# Patient Record
Sex: MALE | Race: BLACK OR AFRICAN AMERICAN | Employment: UNEMPLOYED | URBAN - METROPOLITAN AREA
[De-identification: names, ages, dates, MRNs, and addresses within clinical notes are randomized per-mention and may not be internally consistent; named-entity substitution may affect disease eponyms.]

---

## 2018-06-08 PROBLEM — E11.9 TYPE 2 DIABETES MELLITUS (HCC): Chronic | Status: ACTIVE | Noted: 2018-06-08

## 2018-06-08 PROBLEM — E78.5 HLD (HYPERLIPIDEMIA): Chronic | Status: ACTIVE | Noted: 2018-06-08

## 2018-06-08 PROBLEM — R07.9 CHEST PAIN: Status: ACTIVE | Noted: 2018-06-08

## 2018-06-08 PROBLEM — E87.6 HYPOKALEMIA: Status: ACTIVE | Noted: 2018-06-08

## 2018-06-08 PROBLEM — I10 ESSENTIAL HYPERTENSION: Chronic | Status: ACTIVE | Noted: 2018-06-08

## 2019-11-13 ENCOUNTER — HOSPITAL ENCOUNTER (OUTPATIENT)
Dept: NUCLEAR MEDICINE | Age: 59
Discharge: HOME | End: 2019-11-13
Payer: COMMERCIAL

## 2019-11-13 DIAGNOSIS — C61 PROSTATE CANCER (HCC): Primary | ICD-10-CM

## 2019-11-13 DIAGNOSIS — C61 PROSTATE CANCER (HCC): ICD-10-CM

## 2019-11-13 PROCEDURE — 3430000000 HC RX DIAGNOSTIC RADIOPHARMACEUTICAL: Performed by: REGISTERED NURSE

## 2019-11-13 PROCEDURE — 78306 BONE IMAGING WHOLE BODY: CPT

## 2019-11-13 PROCEDURE — A9503 TC99M MEDRONATE: HCPCS | Performed by: REGISTERED NURSE

## 2019-11-13 RX ORDER — TC 99M MEDRONATE 20 MG/10ML
25 INJECTION, POWDER, LYOPHILIZED, FOR SOLUTION INTRAVENOUS
Status: COMPLETED | OUTPATIENT
Start: 2019-11-13 | End: 2019-11-13

## 2019-11-13 RX ADMIN — TC 99M MEDRONATE 24.6 MILLICURIE: 20 INJECTION, POWDER, LYOPHILIZED, FOR SOLUTION INTRAVENOUS at 11:11

## 2020-06-09 ENCOUNTER — HOSPITAL ENCOUNTER (OUTPATIENT)
Dept: CT IMAGING | Age: 60
Discharge: HOME | End: 2020-06-09
Payer: COMMERCIAL

## 2020-06-09 LAB
GFR AFRICAN AMERICAN: >60
GFR NON-AFRICAN AMERICAN: >60
PERFORMED ON: NORMAL
POC CREATININE: 0.9 MG/DL (ref 0.9–1.3)
POC SAMPLE TYPE: NORMAL

## 2020-06-09 PROCEDURE — 6360000004 HC RX CONTRAST MEDICATION: Performed by: NURSE PRACTITIONER

## 2020-06-09 PROCEDURE — 74177 CT ABD & PELVIS W/CONTRAST: CPT

## 2020-06-09 PROCEDURE — 82565 ASSAY OF CREATININE: CPT

## 2020-06-09 RX ADMIN — IOHEXOL 50 ML: 240 INJECTION, SOLUTION INTRATHECAL; INTRAVASCULAR; INTRAVENOUS; ORAL at 12:48

## 2020-06-09 RX ADMIN — IOPAMIDOL 80 ML: 755 INJECTION, SOLUTION INTRAVENOUS at 12:49

## 2021-10-11 ENCOUNTER — HOSPITAL ENCOUNTER (EMERGENCY)
Age: 61
Discharge: HOME | End: 2021-10-11
Attending: EMERGENCY MEDICINE
Payer: COMMERCIAL

## 2021-10-11 ENCOUNTER — APPOINTMENT (OUTPATIENT)
Dept: GENERAL RADIOLOGY | Age: 61
End: 2021-10-11
Payer: COMMERCIAL

## 2021-10-11 VITALS — SYSTOLIC BLOOD PRESSURE: 163 MMHG | TEMPERATURE: 98.7 F | HEART RATE: 87 BPM | DIASTOLIC BLOOD PRESSURE: 80 MMHG

## 2021-10-11 DIAGNOSIS — R07.9 CHEST PAIN, UNSPECIFIED TYPE: Primary | ICD-10-CM

## 2021-10-11 LAB
ANION GAP SERPL CALCULATED.3IONS-SCNC: 15 MMOL/L (ref 3–16)
BASOPHILS ABSOLUTE: 0.1 K/UL (ref 0–0.2)
BASOPHILS RELATIVE PERCENT: 1.2 %
BUN BLDV-MCNC: 14 MG/DL (ref 7–20)
CALCIUM SERPL-MCNC: 9.5 MG/DL (ref 8.3–10.6)
CHLORIDE BLD-SCNC: 101 MMOL/L (ref 99–110)
CO2: 20 MMOL/L (ref 21–32)
CREAT SERPL-MCNC: 0.7 MG/DL (ref 0.9–1.3)
D DIMER: <200 NG/ML DDU (ref 0–229)
EKG ATRIAL RATE: 82 BPM
EKG DIAGNOSIS: NORMAL
EKG P AXIS: 39 DEGREES
EKG P-R INTERVAL: 206 MS
EKG Q-T INTERVAL: 366 MS
EKG QRS DURATION: 84 MS
EKG QTC CALCULATION (BAZETT): 427 MS
EKG R AXIS: 11 DEGREES
EKG T AXIS: 21 DEGREES
EKG VENTRICULAR RATE: 82 BPM
EOSINOPHILS ABSOLUTE: 0.1 K/UL (ref 0–0.6)
EOSINOPHILS RELATIVE PERCENT: 1.6 %
GFR AFRICAN AMERICAN: >60
GFR NON-AFRICAN AMERICAN: >60
GLUCOSE BLD-MCNC: 242 MG/DL (ref 70–99)
HCT VFR BLD CALC: 38.2 % (ref 40.5–52.5)
HEMOGLOBIN: 12.9 G/DL (ref 13.5–17.5)
LYMPHOCYTES ABSOLUTE: 1.4 K/UL (ref 1–5.1)
LYMPHOCYTES RELATIVE PERCENT: 21.7 %
MCH RBC QN AUTO: 31.5 PG (ref 26–34)
MCHC RBC AUTO-ENTMCNC: 33.6 G/DL (ref 31–36)
MCV RBC AUTO: 93.7 FL (ref 80–100)
MONOCYTES ABSOLUTE: 0.4 K/UL (ref 0–1.3)
MONOCYTES RELATIVE PERCENT: 5.5 %
NEUTROPHILS ABSOLUTE: 4.6 K/UL (ref 1.7–7.7)
NEUTROPHILS RELATIVE PERCENT: 70 %
PDW BLD-RTO: 13.8 % (ref 12.4–15.4)
PLATELET # BLD: 218 K/UL (ref 135–450)
PMV BLD AUTO: 9.9 FL (ref 5–10.5)
POTASSIUM REFLEX MAGNESIUM: 4.1 MMOL/L (ref 3.5–5.1)
RBC # BLD: 4.08 M/UL (ref 4.2–5.9)
SODIUM BLD-SCNC: 136 MMOL/L (ref 136–145)
TROPONIN: <0.01 NG/ML
TROPONIN: <0.01 NG/ML
WBC # BLD: 6.6 K/UL (ref 4–11)

## 2021-10-11 PROCEDURE — 99282 EMERGENCY DEPT VISIT SF MDM: CPT

## 2021-10-11 PROCEDURE — 85025 COMPLETE CBC W/AUTO DIFF WBC: CPT

## 2021-10-11 PROCEDURE — 85379 FIBRIN DEGRADATION QUANT: CPT

## 2021-10-11 PROCEDURE — 36415 COLL VENOUS BLD VENIPUNCTURE: CPT

## 2021-10-11 PROCEDURE — 84484 ASSAY OF TROPONIN QUANT: CPT

## 2021-10-11 PROCEDURE — 93005 ELECTROCARDIOGRAM TRACING: CPT | Performed by: STUDENT IN AN ORGANIZED HEALTH CARE EDUCATION/TRAINING PROGRAM

## 2021-10-11 PROCEDURE — 80048 BASIC METABOLIC PNL TOTAL CA: CPT

## 2021-10-11 PROCEDURE — 71045 X-RAY EXAM CHEST 1 VIEW: CPT

## 2021-10-11 NOTE — ED PROVIDER NOTES
ED Attending Attestation Note     Date of evaluation: 10/11/2021    This patient was seen by the resident. I have seen and examined the patient, agree with the workup, evaluation, management and diagnosis. The care plan has been discussed. I have reviewed the ECG and concur with the resident's interpretation. I was present for any procedures performed in the resident's  note and have made edits to the note where appropriate. My assessment reveals 62 y.o. male with history of prostate cancer presenting to the emergency department today for chest pain. It is pressure-like in his left lower chest and he states radiates occasionally down to his leg but also to his left arm. He is well-appearing, heart regular rate and rhythm, no murmurs. Lungs clear to auscultation bilaterally. Abdomen soft and benign. Symptoms highly atypical for ACS. Doubt PE, but given his cancer history we will obtain D-dimer as he falls into an overall low but not no risk category. Highly doubt dissection, pneumothorax, or other intrathoracic pathology. His EKG is very reassuring. Did discuss the possibility that this may represent shingles prior to development of rash.          Debbie Guerrero MD  10/11/21 9268

## 2021-10-11 NOTE — ED NOTES
Discharge Summary    Date:10/11/2021        Patient Darling Hernandez     YOB: 1960     Age:60 y.o. Admit Date:10/11/2021   Admission Condition:good   Discharged Condition:good  Discharge Date: 10/11/21     Discharge Diagnoses   Chest pain    Hospital Stay   Narrative of Hospital Course: **      Time Spent on Discharge:  5 minutes were spent in patient examination, evaluation, counseling as well as medication reconciliation, prescriptions for required medications, discharge plan and follow up. Radiology Last 7 Days:  XR CHEST PORTABLE    Result Date: 10/11/2021  No acute cardiopulmonary findings.       Discharge Plan   Disposition: Home    Provider Follow-Up:   Edmar Prieto MD  [place] [address]  [phone]    In 1 week      [facility] Emergency Department  [address] [place] [address]  [phone]  Go to   If symptoms worsen       Hospital/Incidental Findings Requiring Follow-Up:      Discharge Medications         Medication List      ASK your doctor about these medications    albuterol sulfate  (90 Base) MCG/ACT inhaler     amLODIPine 5 MG tablet  Commonly known as: NORVASC     aspirin 81 MG chewable tablet  Take 1 tablet by mouth daily     atorvastatin 40 MG tablet  Commonly known as: LIPITOR     carvedilol 3.125 MG tablet  Commonly known as: COREG  Take 1 tablet by mouth 2 times daily (with meals)     CoQ-10 10 MG Caps     hydroCHLOROthiazide 25 MG tablet  Commonly known as: HYDRODIURIL     HYDROcodone-acetaminophen 5-325 MG per tablet  Commonly known as: NORCO     lansoprazole 30 MG delayed release capsule  Commonly known as: PREVACID     LORazepam 0.5 MG tablet  Commonly known as: ATIVAN     metFORMIN 500 MG tablet  Commonly known as: GLUCOPHAGE            Electronically signed by Precious Campos RN on 10/11/21 at 7:43 PM EDT     Darien Scruggs RN  10/11/21 1944

## 2021-10-11 NOTE — ED PROVIDER NOTES
Medications    ALBUTEROL SULFATE  (90 BASE) MCG/ACT INHALER    Inhale 2 puffs into the lungs every 6 hours as needed for Wheezing    AMLODIPINE (NORVASC) 5 MG TABLET    Take 5 mg by mouth daily    ASPIRIN 81 MG CHEWABLE TABLET    Take 1 tablet by mouth daily    ATORVASTATIN (LIPITOR) 40 MG TABLET    Take 40 mg by mouth daily    CARVEDILOL (COREG) 3.125 MG TABLET    Take 1 tablet by mouth 2 times daily (with meals)    COENZYME Q10 (COQ-10) 10 MG CAPS    Take 1 capsule by mouth daily    HYDROCHLOROTHIAZIDE (HYDRODIURIL) 25 MG TABLET    Take 25 mg by mouth daily    HYDROCODONE-ACETAMINOPHEN (NORCO) 5-325 MG PER TABLET    Take 1 tablet by mouth every 6 hours as needed for Pain    LANSOPRAZOLE (PREVACID) 30 MG DELAYED RELEASE CAPSULE    Take 30 mg by mouth daily    LORAZEPAM (ATIVAN) 0.5 MG TABLET    Take 0.5 mg by mouth every 12 hours as needed. Ronak Roberts METFORMIN (GLUCOPHAGE) 500 MG TABLET    Take 500 mg by mouth daily (with breakfast)        Allergies     He has No Known Allergies. Physical Exam     INITIAL VITALS: BP: (!) 163/80, Temp: 98.7 °F (37.1 °C), Pulse: 87,  ,     Physical Exam  Constitutional:       Appearance: Normal appearance. He is normal weight. Cardiovascular:      Rate and Rhythm: Normal rate and regular rhythm. Pulses: Normal pulses. Heart sounds: Normal heart sounds. Pulmonary:      Effort: Pulmonary effort is normal.      Breath sounds: Normal breath sounds. Abdominal:      General: Bowel sounds are normal.      Palpations: Abdomen is soft. Musculoskeletal:         General: No tenderness. Normal range of motion. Cervical back: Normal range of motion and neck supple. Skin:     General: Skin is warm and dry. Capillary Refill: Capillary refill takes less than 2 seconds. Findings: No rash. Neurological:      General: No focal deficit present. Mental Status: He is alert and oriented to person, place, and time. Mental status is at baseline. Diagnostic Results     EKG   Interpreted inconjunction with emergency department physician Efe Armstrong MD  Rhythm: normal sinus   Rate: normal  Axis: normal  Ectopy: none  Conduction: normal  ST Segments: normal  T Waves: normal  Q Waves: none  Clinical Impression: non-specific EKG  Comparison:  Sinus arrhythmia with borderline first-degree AV block. RADIOLOGY:  XR CHEST PORTABLE   Final Result      No acute cardiopulmonary findings.           LABS:   Results for orders placed or performed during the hospital encounter of 10/11/21   CBC Auto Differential   Result Value Ref Range    WBC 6.6 4.0 - 11.0 K/uL    RBC 4.08 (L) 4.20 - 5.90 M/uL    Hemoglobin 12.9 (L) 13.5 - 17.5 g/dL    Hematocrit 38.2 (L) 40.5 - 52.5 %    MCV 93.7 80.0 - 100.0 fL    MCH 31.5 26.0 - 34.0 pg    MCHC 33.6 31.0 - 36.0 g/dL    RDW 13.8 12.4 - 15.4 %    Platelets 263 094 - 004 K/uL    MPV 9.9 5.0 - 10.5 fL    Neutrophils % 70.0 %    Lymphocytes % 21.7 %    Monocytes % 5.5 %    Eosinophils % 1.6 %    Basophils % 1.2 %    Neutrophils Absolute 4.6 1.7 - 7.7 K/uL    Lymphocytes Absolute 1.4 1.0 - 5.1 K/uL    Monocytes Absolute 0.4 0.0 - 1.3 K/uL    Eosinophils Absolute 0.1 0.0 - 0.6 K/uL    Basophils Absolute 0.1 0.0 - 0.2 K/uL   Basic Metabolic Panel w/ Reflex to MG   Result Value Ref Range    Sodium 136 136 - 145 mmol/L    Potassium reflex Magnesium 4.1 3.5 - 5.1 mmol/L    Chloride 101 99 - 110 mmol/L    CO2 20 (L) 21 - 32 mmol/L    Anion Gap 15 3 - 16    Glucose 242 (H) 70 - 99 mg/dL    BUN 14 7 - 20 mg/dL    CREATININE 0.7 (L) 0.9 - 1.3 mg/dL    GFR Non-African American >60 >60    GFR African American >60 >60    Calcium 9.5 8.3 - 10.6 mg/dL   Troponin   Result Value Ref Range    Troponin <0.01 <0.01 ng/mL   Troponin   Result Value Ref Range    Troponin <0.01 <0.01 ng/mL   D-Dimer, Quantitative   Result Value Ref Range    D-Dimer, Quant <200 0 - 229 ng/mL DDU   EKG 12 Lead   Result Value Ref Range    Ventricular Rate 82 BPM Atrial Rate 82 BPM    P-R Interval 206 ms    QRS Duration 84 ms    Q-T Interval 366 ms    QTc Calculation (Bazett) 427 ms    P Axis 39 degrees    R Axis 11 degrees    T Axis 21 degrees    Diagnosis       EKG performed in ER and to be interpreted by ER physician. Confirmed by MD, ER ([id number]),  Sherrill Guido ([phone]) on 10/11/2021 6:52:49 PM       ED BEDSIDE ULTRASOUND:  None performed. RECENT VITALS:  BP: (!) 163/80, Temp: 98.7 °F (37.1 °C),Pulse: 87,  ,       Procedures     None performed. ED Course     Nursing Notes, Past Medical Hx, Past Surgical Hx, Social Hx, Allergies, and FamilyHx were reviewed. The patient was giventhe following medications:  No orders of the defined types were placed in this encounter. CONSULTS:  None    MEDICAL DECISION MAKING / ASSESSMENT / Roberto Jay is a 62 y.o. male who presents with acute left-sided chest pain. Upon initial presentation, patient was hemodynamically stable, afebrile, and saturating 100% on RA. Cardiac, pulmonary, and abdominal exam are unremarkable. BMP was unremarkable with exception of hyperglycemia (242). Troponin negative x2. CBC unremarkable. D-dimer negative. CXR revealed no acute cardiopulmonary changes. EKG revealed normal sinus rhythm with no changes in ST segments or T wave changes. Heart score 3. Well's score 1. Given patient presentation and negative lab and imaging findings, symptoms are less likely in cardiac and pulmonary vs musculoskeletal in origin. Cannot rule out if symptoms are a side effect from Abiraterone usage from history of prostate cancer. Patient did endorse improvement of his symptoms when he discontinued using it. Advised patient to follow up with his urologist sometime this week to reconsider new medication or adjustments. This patient was also evaluated by the attending physician. All care plans were discussed and agreed upon. Clinical Impression     1.  Chest pain, unspecified type Disposition     PATIENT REFERRED TO:  Campos Herring MD  [address] [address]  [phone]    In 1 week      [facility] Emergency Department  [address]  [id number] [phone]  Go to   If symptoms worsen      DISCHARGE MEDICATIONS:  New Prescriptions    No medications on file       DISPOSITION Decision To Discharge 10/11/2021 06:49:43 PM        Nicolás Brown MD  Resident  10/11/21 3511

## 2022-02-07 ENCOUNTER — OFFICE VISIT (OUTPATIENT)
Dept: SURGERY | Age: 62
End: 2022-02-07
Payer: COMMERCIAL

## 2022-02-07 VITALS — BODY MASS INDEX: 31.1 KG/M2 | WEIGHT: 205.2 LBS | HEIGHT: 68 IN

## 2022-02-07 DIAGNOSIS — N62 GYNECOMASTIA: Primary | ICD-10-CM

## 2022-02-07 PROCEDURE — 3017F COLORECTAL CA SCREEN DOC REV: CPT | Performed by: SURGERY

## 2022-02-07 PROCEDURE — 99204 OFFICE O/P NEW MOD 45 MIN: CPT | Performed by: SURGERY

## 2022-02-07 PROCEDURE — G8417 CALC BMI ABV UP PARAM F/U: HCPCS | Performed by: SURGERY

## 2022-02-07 PROCEDURE — G8484 FLU IMMUNIZE NO ADMIN: HCPCS | Performed by: SURGERY

## 2022-02-07 PROCEDURE — G8427 DOCREV CUR MEDS BY ELIG CLIN: HCPCS | Performed by: SURGERY

## 2022-02-07 PROCEDURE — 1036F TOBACCO NON-USER: CPT | Performed by: SURGERY

## 2022-02-07 RX ORDER — TRIPTORELIN PAMOATE 11.25 MG
KIT INTRAMUSCULAR
COMMUNITY
Start: 2021-11-01

## 2022-02-07 RX ORDER — DENOSUMAB 60 MG/ML
INJECTION SUBCUTANEOUS
COMMUNITY
Start: 2021-11-01

## 2022-02-07 RX ORDER — ABIRATERONE ACETATE 250 MG/1
TABLET ORAL
COMMUNITY
Start: 2021-12-08

## 2022-02-07 RX ORDER — PREDNISONE 1 MG/1
TABLET ORAL
COMMUNITY
Start: 2022-02-02

## 2022-02-07 NOTE — PROGRESS NOTES
[facility]    Consultation requested by: Dr. Hoda Sosa (Urology)    CC: Body contouring    HPI: 62 y.o. male with a PMHx as delineated below who presents in consultation for body contouring. He has been having issues with gynecomastia that began after beginning his chemotherapy. He has prostate cancer and underwent a prostatectomy and adjuvant chemotherapy (unsure name). He presents to plastic surgery for discussion regarding options.     Bariatric surgery: No  Max weight (lbs): 210  Lowest weight (lbs): 189  Current (lbs): 205  Weight change in the past 3 months: No  Max BMI: 33  Current BMI: 31.2    History of DVT/PE: No  Excess skin cover genitals: No  Previous body contouring procedures: No  Smoking: No    Past Medical History:   Diagnosis Date    Anxiety     Cancer [facility] [address]     prostate    Diabetes mellitus ([facility] [address]     H/O cardiovascular stress test     ? 2016 at [address] [place] [address] H/O coronary angiogram     [facility]     PONV (postoperative nausea and vomiting)    HgbA1c - 8.6 ()    Past Surgical History:   Procedure Laterality Date    NECK SURGERY      w/ screw    PROSTATECTOMY N/A [id number]    SHOULDER SURGERY      x2    WRIST TENOLYSIS      x2     Social History     Socioeconomic History    Marital status:      Spouse name: Not on file    Number of children: Not on file    Years of education: Not on file    Highest education level: Not on file   Occupational History    Not on file   Tobacco Use    Smoking status: Former Smoker     Quit date: 1982     Years since quittin.7    Smokeless tobacco: Never Used   Substance and Sexual Activity    Alcohol use: Yes     Comment: 1-2 drinks 4-5 x a week -liquor    Drug use: No    Sexual activity: Yes     Partners: Female   Other Topics Concern    Not on file   Social History Narrative    Not on file     Social Determinants of Health     Financial Resource Strain:     Difficulty of Paying Palpable firm breast tissue in the bilateral chest (R>L) with ptosis noted and creation of IMF  ABD: soft/NT/obese  No palpable hernia, but exam limited secondary to habitus  Grade 2 pannus  EXT: No lymphedema noted  NEURO: No focal deficits, no obvious CN deficits    IMP: 62 y.o. male with gynecomastia  PLAN: Given the medication induced nature, would see if he can stop his medication or be transitioned to another one and evaluate if there is improvement after several months. If he needs surgery, would offer a hybrid approach with excision followed by liposuction for improved contour. However, prior to any surgical intervention, his A1c needs to be significant improved (A1c ~ 6.5). The complexity of body contouring procedures and wound healing physiology were discussed with the patient. He was counseled on ideal medical optimization (nutrition, weight stability, etc.). We also discussed the pros & cons of staging for multiple procedures including controlling tension from various sites and postoperative care managment. Clinical photos were obtained. The risks, benefits, alternatives, outcomes, personnel involved were discussed and all questions were answered in a satisfactory manner according to the patient. Specifically,the risks including, but not limited to: bleeding possibly requiring transfusion or reoperation, infection, seroma, nonhealing of wounds, nipple loss, saucer deformity, poor cosmetic outcome, scarring, VTE (DVT/PE), and death were discussed. The patient was counseled at length about the risks of cindi Covid-19 during their perioperative period and any recovery window from their procedure. The patient was made aware that cindi Covid-19  may worsen their prognosis for recovering from their procedure  and lend to a higher morbidity and/or mortality risk. All material risks, benefits, and reasonable alternatives including postponing the procedure were discussed.  The patient does wish to proceed with the procedure at this time.     Arline Dee MD  [address] [place] [address] Reconstructive Surgery  [phone]  02/07/22

## 2022-04-06 ENCOUNTER — TELEPHONE (OUTPATIENT)
Dept: SURGERY | Age: 62
End: 2022-04-06

## 2022-04-06 NOTE — TELEPHONE ENCOUNTER
Patient last seen on 2/7/22:   IMP: 62 y.o. male with gynecomastia  PLAN: Given the medication induced nature, would see if he can stop his medication or be transitioned to another one and evaluate if there is improvement after several months. If he needs surgery, would offer a hybrid approach with excision followed by liposuction for improved contour. However, prior to any surgical intervention, his A1c needs to be significant improved (A1c ~ 6.5). A1c. Drawn yesterday at [facility] was 7.0 in [address] [place] [address]. Discussed with patient that per MD would not offer surgery until a1c is 6.5 or less. Patient voices understanding.

## 2022-04-06 NOTE — TELEPHONE ENCOUNTER
Pt called informs he had blood work done yesterday at [facility]. Doctor told him his sugar level has improved and is ready to move forward with scheduling surgery. Pt contact [phone].

## 2022-04-11 ENCOUNTER — HOSPITAL ENCOUNTER (OUTPATIENT)
Dept: NUCLEAR MEDICINE | Age: 62
Discharge: HOME | End: 2022-04-11
Payer: COMMERCIAL

## 2022-04-11 DIAGNOSIS — C61 PROSTATE CANCER (HCC): ICD-10-CM

## 2022-04-11 PROCEDURE — A9503 TC99M MEDRONATE: HCPCS | Performed by: RADIOLOGY

## 2022-04-11 PROCEDURE — 3430000000 HC RX DIAGNOSTIC RADIOPHARMACEUTICAL: Performed by: RADIOLOGY

## 2022-04-11 PROCEDURE — 78306 BONE IMAGING WHOLE BODY: CPT

## 2022-04-11 RX ORDER — TC 99M MEDRONATE 20 MG/10ML
26.8 INJECTION, POWDER, LYOPHILIZED, FOR SOLUTION INTRAVENOUS
Status: COMPLETED | OUTPATIENT
Start: 2022-04-11 | End: 2022-04-11

## 2022-04-11 RX ADMIN — TC 99M MEDRONATE 26.8 MILLICURIE: 20 INJECTION, POWDER, LYOPHILIZED, FOR SOLUTION INTRAVENOUS at 10:48

## 2022-07-06 ENCOUNTER — OFFICE VISIT (OUTPATIENT)
Dept: SURGERY | Age: 62
End: 2022-07-06
Payer: COMMERCIAL

## 2022-07-06 VITALS
SYSTOLIC BLOOD PRESSURE: 137 MMHG | RESPIRATION RATE: 16 BRPM | DIASTOLIC BLOOD PRESSURE: 84 MMHG | OXYGEN SATURATION: 99 % | TEMPERATURE: 97.3 F | BODY MASS INDEX: 31.07 KG/M2 | WEIGHT: 205 LBS | HEIGHT: 68 IN | HEART RATE: 101 BPM

## 2022-07-06 DIAGNOSIS — N62 GYNECOMASTIA: Primary | ICD-10-CM

## 2022-07-06 PROCEDURE — 99213 OFFICE O/P EST LOW 20 MIN: CPT | Performed by: SURGERY

## 2022-07-06 PROCEDURE — 3017F COLORECTAL CA SCREEN DOC REV: CPT | Performed by: SURGERY

## 2022-07-06 PROCEDURE — 1036F TOBACCO NON-USER: CPT | Performed by: SURGERY

## 2022-07-06 PROCEDURE — G8417 CALC BMI ABV UP PARAM F/U: HCPCS | Performed by: SURGERY

## 2022-07-06 PROCEDURE — G8427 DOCREV CUR MEDS BY ELIG CLIN: HCPCS | Performed by: SURGERY

## 2022-07-06 NOTE — PROGRESS NOTES
[facility]    Consultation requested by: Dr. Ramos Mast (Urology)    CC: Body contouring    HPI: 62 y.o. male with a PMHx as delineated below who presents in consultation for body contouring. He has been having issues with gynecomastia that began after beginning his chemotherapy. He has prostate cancer and underwent a prostatectomy and adjuvant chemotherapy (unsure name). He presents to plastic surgery for discussion regarding options. Since his last evaluation, he notes that his glucose control has improved and his most recent A1c is 6.5 (per patient).      Bariatric surgery: No  Max weight (lbs): 210  Lowest weight (lbs): 189  Current (lbs): 205 (205)  Weight change in the past 3 months: No  Max BMI: 33  Current BMI: 31.1 (31.2)    History of DVT/PE: No  Excess skin cover genitals: No  Previous body contouring procedures: No  Smoking: No    Past Medical History:   Diagnosis Date    Anxiety     Cancer ([facility] [address]     prostate    Diabetes mellitus ([facility] [address] [facility]     H/O cardiovascular stress test     ? 2016 at [address] [place] [address] H/O coronary angiogram     years ago at [address] [place] [address] Hyperlipidemia     PONV (postoperative nausea and vomiting)    HgbA1c - 7 (); 8.6 ()    Past Surgical History:   Procedure Laterality Date    NECK SURGERY      w/ screw    PROSTATECTOMY N/A [id number]    SHOULDER SURGERY      x2    WRIST TENOLYSIS      x2     Social History     Socioeconomic History    Marital status:      Spouse name: Not on file    Number of children: Not on file    Years of education: Not on file    Highest education level: Not on file   Occupational History    Not on file   Tobacco Use    Smoking status: Former Smoker     Quit date: 1982     Years since quittin.1    Smokeless tobacco: Never Used   Vaping Use    Vaping Use: Never used   Substance and Sexual Activity    Alcohol use: Yes     Comment: 1-2 drinks 4-5 x a week -liquor    Drug use: No    Sexual activity: Yes Partners: Female   Other Topics Concern    Not on file   Social History Narrative    Not on file     Social Determinants of Health     Financial Resource Strain:     Difficulty of Paying Living Expenses: Not on file   Food Insecurity:     Worried About Running Out of Food in the Last Year: Not on file    Aure of Food in the Last Year: Not on file   Transportation Needs:     Lack of Transportation (Medical): Not on file    Lack of Transportation (Non-Medical): Not on file   Physical Activity:     Days of Exercise per Week: Not on file    Minutes of Exercise per Session: Not on file   Stress:     Feeling of Stress : Not on file   Social Connections:     Frequency of Communication with Friends and Family: Not on file    Frequency of Social Gatherings with Friends and Family: Not on file    Attends Yarsani Services: Not on file    Active Member of [address] [place] [address] [employer] or Organizations: Not on file    Attends Club or Organization Meetings: Not on file    Marital Status: Not on file   Intimate Partner Violence:     Fear of Current or Ex-Partner: Not on file    Emotionally Abused: Not on file    Physically Abused: Not on file    Sexually Abused: Not on file   Housing Stability:     Unable to Pay for Housing in the Last Year: Not on file    Number of [place] in the Last Year: Not on file    Unstable Housing in the Last Year: Not on file     No family history on file. Allergy: No Known Allergies    ROS History obtained from the patient  General ROS: negative  Psychological ROS: negative  Ophthalmic ROS: negative  Neurological ROS: negative  ENT ROS: negative  Allergy and Immunology ROS: negative  Hematological and Lymphatic ROS: negative  Endocrine ROS: negative  Respiratory ROS: negative  Cardiovascular ROS: negative  Gastrointestinal ROS: See HPI  Genito-Urinary ROS: negative  Musculoskeletal ROS: negative   Skin ROS: See HPI.     EXAM    /84   Pulse (!) 101   Temp 97.3 °F (36.3 °C) (Temporal) Resp 16   Ht 5' 8\" (1.727 m)   Wt 205 lb (93 kg)   SpO2 99%   BMI 31.17 kg/m²     GEN: NAD, pleasant, obese  CVS: RRR  PULM: No respiratory distress  HEENT: PERRLA/EOMI; hearing appears within normal limits  NECK: Supple with trachea in midline, no masses  CHEST: Palpable firm breast tissue in the bilateral chest (R>L) with ptosis noted and creation of IMF  ABD: soft/NT/obese  No palpable hernia, but exam limited secondary to habitus  Grade 2 pannus  EXT: No lymphedema noted  NEURO: No focal deficits, no obvious CN deficits    IMP: 62 y.o. male with gynecomastia  PLAN: Will provide most recent A1c and if satisfactory, will submit to insurance and schedule. He will plan for double incision free nipple grafting for gynecomastia. Additionally, may perform liposuction to the flanks for improved contouring. He understands that the later is cosmetic in nature and pricing to be provided. The complexity of body contouring procedures and wound healing physiology were discussed with the patient. He was counseled on ideal medical optimization (nutrition, weight stability, etc.). We also discussed the pros & cons of staging for multiple procedures including controlling tension from various sites and postoperative care managment. Clinical photos were obtained. The risks, benefits, alternatives, outcomes, personnel involved were discussed and all questions were answered in a satisfactory manner according to the patient. Specifically,the risks including, but not limited to: bleeding possibly requiring transfusion or reoperation, infection, seroma, nonhealing of wounds, nipple loss, saucer deformity, poor cosmetic outcome, scarring, VTE (DVT/PE), and death were discussed.       Thea Krause MD  [address] [place] [address] [address] Reconstructive Surgery  [phone]  07/06/22

## 2022-07-06 NOTE — LETTER
Surgery Schedule Request Form   [facility] OF SURGERY: 2022     TIME OF SURGERY: 1:30 pm     Confirmation#:__________________        Surgeon Name: Adolfo Machado MD    Phone: [phone]     Fax: [phone]  Procedure Name:  1) Excision of bilateral gynecomastia with free nipple grafting  CPT CODES (required for scheduling): 46871, 26 [id number]  DIAGNOSIS: N62  Gynecomastia    LENGTH OF PROCEDURE: 2 hours  Patient Status: Outpatient    Labs Needed:   CBC __  PT/PTT___ INR __  CMP ___ EKG ___   Urine Hcg ___            PATIENT NAME: Erlin Tellez Row OF BIRTH: 1960  SEX: male   SS #:   PHONE: [phone] (home)     Pre-Op to be done by: PCP  Cardiac Clearance Done by: per PCP    Pt Position:  supine  Patient to meet with Anesthesiology prior to surgery: no     Medications to be stopped 5 days before surgery: anticoagulation     Ancef 2 gm IV OCTOR (NOTE:  If patient weight is > 120 kg, Administer 3 gm)  Other Orders: No Decadron, SA    INSURANCE: [employer]                                             SUBSCRIBER NAME: Self   MEMBER ID: [id number]                                         AUTHORIZATION #: [id number]    PCP: Arturo Lepe MD                                        ANESTHESIA:  Oren Ellis MD                             FAX TO: [phone]   QUESTIONS? CALL: [address]   Fax   [phone]            Date Of Procedure: 2022    PATIENT:       Meet Morin                    :  1960      No Known Allergies    No.   PHYSICIAN ORDERS   HUC/  RN      ORDERS WITH CHECK BOXES MUST BE SELECTED. ALL OTHER ORDERS WILL BE AUTOMATICALLY INITIATED. Date / Time of Order:   22   8:55 AM          Procedure: Excision of bilateral gynecomastia with free nipple grafting         1.  Cefazolin (Ancef) 2 gm IV OCTOR   ** NOTE:  If patient weight is > 120 kg, Administer 3 gm         2. ** If PCN allergy, then Clindamycin 600mg IV OCTOR.   ** If prior history of MRSA, Vancomycin 1g IV OCTOR (please check with renal function prior to administration)       3.  Other orders: No Tari,        Physician / Surgeon:  Justin Scruggs MD  Office Ph:  [phone]       Physician Signature:     9/07

## 2022-07-07 ENCOUNTER — TELEPHONE (OUTPATIENT)
Dept: SURGERY | Age: 62
End: 2022-07-07

## 2022-07-07 NOTE — TELEPHONE ENCOUNTER
Patients wife called wondering if yesterdays visit could be held and not sent to insurance. She states her  did see Dr. Chana Guerrier but they were unable to proceed with a full consult since his HgA1C was not located before the appointment. She said she understands that information was needed in order to proceed but the patient did have the test done before the appointment although the results were not able to be located at the time of visit. Therefore he will have to come back to discuss surgery. I advised Lakshmi that I would send this to Dr. Chana Guerrier and see what he says and will call her back with an answer.

## 2022-07-07 NOTE — TELEPHONE ENCOUNTER
I received the A1C results and it is now scanned into Epic under the media tab. The patient was in the office to see Dr. Kennedy Carrillo yesterday. PLAN: Will provide most recent A1c and if satisfactory, will submit to insurance and schedule. He will plan for double incision free nipple grafting for gynecomastia. Additionally, may perform liposuction to the flanks for improved contouring. He understands that the later is cosmetic in nature and pricing to be provided    I received a surgery letter. I will leave this phone note open.

## 2022-07-12 NOTE — TELEPHONE ENCOUNTER
I spoke with the patient at the home number listed to discuss and A1C and submitting to his insurance for pre certification. I will fax a [employer] Prior Authorization Request Form, clinicals and colored pictures today. I will scan the information and the fax success into Epic under the media tab. The patient has been provided an insurance update. The patient will need to come in for education. I will need to provide cosmetic pricing for [id number]    I will leave this phone note open.

## 2022-07-20 NOTE — TELEPHONE ENCOUNTER
I received an updated surgery letter from Dr. Jasson Sanchez. I lmom for the patient at the home number listed. I requested a call back to finalize the surgery plan. I will leave this phone note open.

## 2022-07-21 NOTE — TELEPHONE ENCOUNTER
I lmom for the patient at the home number listed. I requested a call back to finalize the surgery plan.      I will leave this phone note open

## 2022-07-21 NOTE — TELEPHONE ENCOUNTER
The patient returned my call. The patient is now scheduled for surgery with  on 8-. The patient is aware of H&P. The patient is scheduled for his post op appointment 9-1-2022. I will submit the surgery letter today. I will mail the surgery information and instructions to the patient today. I will close this phone note.

## 2022-07-22 ENCOUNTER — NURSE ONLY (OUTPATIENT)
Dept: SURGERY | Age: 62
End: 2022-07-22

## 2022-07-22 DIAGNOSIS — N62 GYNECOMASTIA: Primary | ICD-10-CM

## 2022-07-22 NOTE — PROGRESS NOTES
[facility]  Surgery Education    Patient: Jl Romo  YOB: 1960    HPI: Jl Romo is a 62 y.o. male who presents today for gynecomastia surgery education. P. He has been having issues with gynecomastia that began after beginning his chemotherapy. He has prostate cancer and underwent a prostatectomy and adjuvant chemotherapy. Greater than 60 minutes was spent face to face with patient and wife, Fariba Salazar, discussing preoperative and postoperative expectations, including wound care, surgical compression ace wrap, drain care, medications, nutrition intake of increased protein and activity restrictions. Chief Complaint   Patient presents with    Other     Gynecomastia surgery education       Plan: Joceline Khoury wishes to proceed with 1) Excision of bilateral gynecomastia with free nipple grafting. Procedure is scheduled on 08/25/2022. Will call with any questions prior to procedure.        Nuha Chao, BSN, RN [address] [place] [address] 177 and Reconstructive Surgery  [phone]

## 2022-08-17 RX ORDER — OXYCODONE HYDROCHLORIDE AND ACETAMINOPHEN 5; 325 MG/1; MG/1
1 TABLET ORAL EVERY 4 HOURS PRN
COMMUNITY

## 2022-08-17 RX ORDER — PIOGLITAZONEHYDROCHLORIDE 30 MG/1
30 TABLET ORAL DAILY
COMMUNITY

## 2022-08-17 NOTE — PROGRESS NOTES
instructions to follow. If you did not receive these, call your surgeon's office immediately. 5. MEDICATIONS:  Take the following medications with a SMALL sip of water: AMLODIPINE, OMEPERZOLE, COREG, ATIVAN, PERCOCET IF NEEDED  Use your usual dose of inhalers the morning of surgery. BRING your rescue inhaler with you to hospital.   Anesthesia does NOT want you to take insulin the morning of surgery. They will control your blood sugar while you are at the hospital. Please contact your ordering physician for instructions regarding your insulin the night before your procedure. If you have an insulin pump, please keep it set on basal rate. Bariatric patient's call your surgeon if on diabetic medications as some may need to be stopped 1 week prior to surgery    6. Do not swallow additional water when brushing teeth. No gum, candy, mints, or ice chips. Refrain from smoking or at least decrease the amount on day of surgery. 7. Morning of surgery:   Take a shower with an antibacterial soap (i.e., Safeguard or Dial) OR your physician may have instructed you to use Hibiclens. Dress in loose, comfortable clothing appropriate for redressing after your procedure. Do not wear jewelry (including body piercings), make-up (especially NO eye make-up), fingernail polish (NO toenail polish if foot/leg surgery), lotion, powders, or metal hairclips. Do not shave or wax for 72 hours prior to procedure near your operative site. Shaving with a razor can irritate your skin and make it easier to develop an infection. On the day of your procedure, any hair that needs to be removed near the surgical site will be 'clipped' by a healthcare worker using a special clipper designed to avoid skin irritation. 8. Dentures, glasses, or contacts will need to be removed before your procedure. Bring cases for your glasses, contacts, dentures, or hearing aids to protect them while you are in surgery.       9. If you use a CPAP, please bring it with you on the day of your procedure. 10. We recommend that valuable personal belongings such as cash, cell phones, e-tablets, or jewelry, be left at home during your stay. The hospital will not be responsible for valuables that are not secured in the hospital safe. However, if your insurance requires a co-pay, you may want to bring a method of payment, i.e., Check or credit card, if you wish to pay your co-pay the day of surgery. 11. If you are to stay overnight, you may bring a bag with personal items. Please have any large items you may need brought in by your family after your arrival to your hospital room. 12. If you have a Living Will or Durable Power of , please bring a copy on the day of your procedure. How we keep you safe and work to prevent surgical site infections:   1. Health care workers should always check your ID bracelet to verify your name and birth date. You will be asked many times to state your name, date of birth, and allergies. 2. Health care workers should always clean their hands with soap or alcohol gel before providing care to you. It is okay to ask anyone if they cleaned their hands before they touch you. 3. You will be actively involved in verifying the type of procedure you are having and ensuring the correct surgical site. This will be confirmed multiple times prior to your procedure. Do NOT lexy your surgery site UNLESS instructed to by your surgeon. 4. When you are in the operating room, your surgical site will be cleansed with a special soap, and in most cases, you will be given an antibiotic before the surgery begins. What to expect AFTER your procedure? 1. Immediately following your procedure, your will be taken to the PACU for the first phase of your recovery. Your nurse will help you recover from any potential side effects of anesthesia, such as extreme drowsiness, changes in your vital signs or breathing patterns.  Nausea, headache, muscle aches, or sore throat may also occur after anesthesia. Your nurse will help you manage these potential side effects. 2. For comfort and safety, arrange to have someone at home with you for the first 24 hours after discharge. 3. You and your family will be given written instructions about your diet, activity, dressing care, medications, and return visits. 4. Once at home, should issues with nausea, pain, or bleeding occur, or should you notice any signs of infection, you should call your surgeon. 5. Always clean your hands before and after caring for your wound. Do not let your family touch your surgery site without cleaning their hands. 6. Narcotic pain medications can cause significant constipation. You may want to add a stool softener to your postoperative medication schedule or speak to your surgeon on how best to manage this SIDE EFFECT. SPECIAL INSTRUCTIONS PATIENT ACKNOWLEDGES UNDERSTANDING OF PRE OP INSTRUCTIONS. Thank you for allowing us to care for you. We strive to exceed your expectations in the delivery of care and service provided to you and your family. If you need to contact the [facility] [address] staff for any reason, please call us at [phone]    Instructions reviewed with patient during preadmission testing phone interview.   Jena Smyth RN.8/17/2022 .10:33 AM      ADDITIONAL EDUCATIONAL INFORMATION REVIEWED PER PHONE WITH YOU AND/OR YOUR FAMILY:  Yes Hibiclens® Bathing Instructions   No Antibacterial Soap

## 2022-08-17 NOTE — PROGRESS NOTES
EKG requested from office of Dr Gely Jovel [phone] spoke with Hallie Avery and she will fax.  -db

## 2022-08-17 NOTE — PROGRESS NOTES
Place patient label inside box (if no patient label, complete below)  Name:  :  MR#:   Long Guzman / PROCEDURE  I (we), Monatna Ruvalcaba (Patient Name) authorize Nicole King MD (Provider / Ignacio Grimes) and/or such assistants as may be selected by him/her, to perform the following operation/procedure(s): EXCISION OF BILATERAL GYNECOMASTIA WITH FREE NIPPLE GRAFTING       Note: If unable to obtain consent prior to an emergent procedure, document the emergent reason in the medical record. This procedure has been explained to my (our) satisfaction and included in the explanation was: The intended benefit, nature, and extent of the procedure to be performed; The significant risks involved and the probability of success; Alternative procedures and methods of treatment; The dangers and probable consequences of such alternatives (including no procedure or treatment); The expected consequences of the procedure on my future health; Whether other qualified individuals would be performing important surgical tasks and/or whether  would be present to advise or support the procedure. I (we) understand that there are other risks of infection and other serious complications in the pre-operative/procedural and postoperative/procedural stages of my (our) care. I (we) have asked all of the questions which I (we) thought were important in deciding whether or not to undergo treatment or diagnosis. These questions have been answered to my (our) satisfaction. I (we) understand that no assurance can be given that the procedure will be a success, and no guarantee or warranty of success has been given to me (us). It has been explained to me (us) that during the course of the operation/procedure, unforeseen conditions may be revealed that necessitate extension of the original procedure(s) or different procedure(s) than those set forth in Paragraph 1.  I (we) authorize and request that the above-named physician, his/her assistants or his/her designees, perform procedures as necessary and desirable if deemed to be in my (our) best interest.     Revised 8/2/2021                                                                          Page 1 of 2       I acknowledge that health care personnel may be observing this procedure for the purpose of medical education or other specified purposes as may be necessary as requested and/or approved by my (our) physician. I (we) consent to the disposal by the hospital Pathologist of the removed tissue, parts or organs in accordance with hospital policy. I do ____ do not ____ consent to the use of a local infiltration pain blocking agent that will be used by my provider/surgical provider to help alleviate pain during my procedure. I do ____ do not ____ consent to an emergent blood transfusion in the case of a life-threatening situation that requires blood components to be administered. This consent is valid for 24 hours from the beginning of the procedure. This patient does ____ or does not ____ currently have a DNR status/order. If DNR order is in place, obtain Addendum to the Surgical Consent for ALL Patients with a DNR Order to address mamta-operative status for limited intervention or DNR suspension.      I have read and fully understand the above Consent for Operation/Procedure and that all blanks were completed before I signed the consent.   _____________________________       _____________________      ____/____am/pm  Signature of Patient or legal representative      Printed Name / Relationship            Date / Time   ____________________________       _____________________      ____/____am/pm  Witness to Signature                                    Printed Name                    Date / Time    If patient is unable to sign or is a minor, complete the following)  Patient is a minor, ____ years of age, or unable to sign because:   ______________________________________________________________________________________________    If a phone consent is obtained, consent will be documented by using two health care professionals, each affirming that the consenting party has no questions and gives consent for the procedure discussed with the physician/provider.   _____________________          ____________________       _____/_____am/pm   2nd witness to phone consent        Printed name           Date / Time    Informed Consent:  I have provided the explanation described above in section 1 to the patient and/or legal representative.  I have provided the patient and/or legal representative with an opportunity to ask any questions about the proposed operation/procedure.   ___________________________          ____________________         ____/____am/pm  Provider / Proceduralist                            Printed name            Date / Time  Revised 8/2/2021                                                                      Page 2 of 2

## 2022-08-24 ENCOUNTER — ANESTHESIA EVENT (OUTPATIENT)
Dept: OPERATING ROOM | Age: 62
End: 2022-08-24
Payer: COMMERCIAL

## 2022-08-25 ENCOUNTER — ANESTHESIA (OUTPATIENT)
Dept: OPERATING ROOM | Age: 62
End: 2022-08-25
Payer: COMMERCIAL

## 2022-08-25 ENCOUNTER — HOSPITAL ENCOUNTER (OUTPATIENT)
Age: 62
Setting detail: OUTPATIENT SURGERY
Discharge: HOME | End: 2022-08-25
Attending: SURGERY | Admitting: SURGERY
Payer: COMMERCIAL

## 2022-08-25 VITALS
RESPIRATION RATE: 15 BRPM | OXYGEN SATURATION: 93 % | WEIGHT: 209.4 LBS | TEMPERATURE: 97.2 F | HEART RATE: 81 BPM | BODY MASS INDEX: 31.74 KG/M2 | HEIGHT: 68 IN | SYSTOLIC BLOOD PRESSURE: 154 MMHG | DIASTOLIC BLOOD PRESSURE: 79 MMHG

## 2022-08-25 DIAGNOSIS — G89.18 ACUTE POST-OPERATIVE PAIN: Primary | ICD-10-CM

## 2022-08-25 DIAGNOSIS — N62 GYNECOMASTIA: ICD-10-CM

## 2022-08-25 LAB
GLUCOSE BLD-MCNC: 92 MG/DL (ref 70–99)
PERFORMED ON: NORMAL

## 2022-08-25 PROCEDURE — 2500000003 HC RX 250 WO HCPCS: Performed by: NURSE ANESTHETIST, CERTIFIED REGISTERED

## 2022-08-25 PROCEDURE — 2500000003 HC RX 250 WO HCPCS: Performed by: SURGERY

## 2022-08-25 PROCEDURE — 2580000003 HC RX 258: Performed by: ANESTHESIOLOGY

## 2022-08-25 PROCEDURE — 6360000002 HC RX W HCPCS: Performed by: SURGERY

## 2022-08-25 PROCEDURE — 2580000003 HC RX 258: Performed by: SURGERY

## 2022-08-25 PROCEDURE — C1729 CATH, DRAINAGE: HCPCS | Performed by: SURGERY

## 2022-08-25 PROCEDURE — 3700000000 HC ANESTHESIA ATTENDED CARE: Performed by: SURGERY

## 2022-08-25 PROCEDURE — 7100000000 HC PACU RECOVERY - FIRST 15 MIN: Performed by: SURGERY

## 2022-08-25 PROCEDURE — 7100000001 HC PACU RECOVERY - ADDTL 15 MIN: Performed by: SURGERY

## 2022-08-25 PROCEDURE — 3700000001 HC ADD 15 MINUTES (ANESTHESIA): Performed by: SURGERY

## 2022-08-25 PROCEDURE — 7100000011 HC PHASE II RECOVERY - ADDTL 15 MIN: Performed by: SURGERY

## 2022-08-25 PROCEDURE — 6360000002 HC RX W HCPCS: Performed by: ANESTHESIOLOGY

## 2022-08-25 PROCEDURE — 7100000010 HC PHASE II RECOVERY - FIRST 15 MIN: Performed by: SURGERY

## 2022-08-25 PROCEDURE — 2709999900 HC NON-CHARGEABLE SUPPLY: Performed by: SURGERY

## 2022-08-25 PROCEDURE — 3600000004 HC SURGERY LEVEL 4 BASE: Performed by: SURGERY

## 2022-08-25 PROCEDURE — 19300 MASTECTOMY FOR GYNECOMASTIA: CPT | Performed by: SURGERY

## 2022-08-25 PROCEDURE — 6360000002 HC RX W HCPCS: Performed by: NURSE ANESTHETIST, CERTIFIED REGISTERED

## 2022-08-25 PROCEDURE — 3600000014 HC SURGERY LEVEL 4 ADDTL 15MIN: Performed by: SURGERY

## 2022-08-25 PROCEDURE — 88305 TISSUE EXAM BY PATHOLOGIST: CPT

## 2022-08-25 PROCEDURE — 2720000010 HC SURG SUPPLY STERILE: Performed by: SURGERY

## 2022-08-25 PROCEDURE — 19350 NIPPLE/AREOLA RECONSTRUCTION: CPT | Performed by: SURGERY

## 2022-08-25 RX ORDER — SODIUM CHLORIDE 0.9 % (FLUSH) 0.9 %
5-40 SYRINGE (ML) INJECTION EVERY 12 HOURS SCHEDULED
Status: DISCONTINUED | OUTPATIENT
Start: 2022-08-25 | End: 2022-08-25 | Stop reason: HOSPADM

## 2022-08-25 RX ORDER — SODIUM CHLORIDE, SODIUM LACTATE, POTASSIUM CHLORIDE, CALCIUM CHLORIDE 600; 310; 30; 20 MG/100ML; MG/100ML; MG/100ML; MG/100ML
INJECTION, SOLUTION INTRAVENOUS CONTINUOUS
Status: DISCONTINUED | OUTPATIENT
Start: 2022-08-25 | End: 2022-08-25 | Stop reason: HOSPADM

## 2022-08-25 RX ORDER — SODIUM CHLORIDE 9 MG/ML
25 INJECTION, SOLUTION INTRAVENOUS PRN
Status: DISCONTINUED | OUTPATIENT
Start: 2022-08-25 | End: 2022-08-25 | Stop reason: HOSPADM

## 2022-08-25 RX ORDER — HYDRALAZINE HYDROCHLORIDE 20 MG/ML
10 INJECTION INTRAMUSCULAR; INTRAVENOUS
Status: DISCONTINUED | OUTPATIENT
Start: 2022-08-25 | End: 2022-08-25 | Stop reason: HOSPADM

## 2022-08-25 RX ORDER — SODIUM CHLORIDE 0.9 % (FLUSH) 0.9 %
5-40 SYRINGE (ML) INJECTION PRN
Status: DISCONTINUED | OUTPATIENT
Start: 2022-08-25 | End: 2022-08-25 | Stop reason: HOSPADM

## 2022-08-25 RX ORDER — PROPOFOL 10 MG/ML
INJECTION, EMULSION INTRAVENOUS PRN
Status: DISCONTINUED | OUTPATIENT
Start: 2022-08-25 | End: 2022-08-25 | Stop reason: SDUPTHER

## 2022-08-25 RX ORDER — PROCHLORPERAZINE EDISYLATE 5 MG/ML
5 INJECTION INTRAMUSCULAR; INTRAVENOUS
Status: DISCONTINUED | OUTPATIENT
Start: 2022-08-25 | End: 2022-08-25 | Stop reason: HOSPADM

## 2022-08-25 RX ORDER — LIDOCAINE HYDROCHLORIDE 20 MG/ML
INJECTION, SOLUTION INTRAVENOUS PRN
Status: DISCONTINUED | OUTPATIENT
Start: 2022-08-25 | End: 2022-08-25 | Stop reason: SDUPTHER

## 2022-08-25 RX ORDER — ROCURONIUM BROMIDE 10 MG/ML
INJECTION, SOLUTION INTRAVENOUS PRN
Status: DISCONTINUED | OUTPATIENT
Start: 2022-08-25 | End: 2022-08-25 | Stop reason: SDUPTHER

## 2022-08-25 RX ORDER — FENTANYL CITRATE 50 UG/ML
INJECTION, SOLUTION INTRAMUSCULAR; INTRAVENOUS PRN
Status: DISCONTINUED | OUTPATIENT
Start: 2022-08-25 | End: 2022-08-25 | Stop reason: SDUPTHER

## 2022-08-25 RX ORDER — SUCCINYLCHOLINE/SOD CL,ISO/PF 200MG/10ML
SYRINGE (ML) INTRAVENOUS PRN
Status: DISCONTINUED | OUTPATIENT
Start: 2022-08-25 | End: 2022-08-25 | Stop reason: SDUPTHER

## 2022-08-25 RX ORDER — OXYCODONE HYDROCHLORIDE 5 MG/1
5 TABLET ORAL EVERY 6 HOURS PRN
Qty: 28 TABLET | Refills: 0 | Status: SHIPPED | OUTPATIENT
Start: 2022-08-25 | End: 2022-08-25 | Stop reason: HOSPADM

## 2022-08-25 RX ORDER — MIDAZOLAM HYDROCHLORIDE 1 MG/ML
INJECTION INTRAMUSCULAR; INTRAVENOUS PRN
Status: DISCONTINUED | OUTPATIENT
Start: 2022-08-25 | End: 2022-08-25 | Stop reason: SDUPTHER

## 2022-08-25 RX ORDER — MEPERIDINE HYDROCHLORIDE 25 MG/ML
12.5 INJECTION INTRAMUSCULAR; INTRAVENOUS; SUBCUTANEOUS EVERY 5 MIN PRN
Status: DISCONTINUED | OUTPATIENT
Start: 2022-08-25 | End: 2022-08-25 | Stop reason: HOSPADM

## 2022-08-25 RX ORDER — CEPHALEXIN 500 MG/1
500 CAPSULE ORAL 4 TIMES DAILY
Qty: 40 CAPSULE | Refills: 0 | Status: SHIPPED | OUTPATIENT
Start: 2022-08-25 | End: 2022-09-04

## 2022-08-25 RX ORDER — HYDROMORPHONE HCL 110MG/55ML
PATIENT CONTROLLED ANALGESIA SYRINGE INTRAVENOUS PRN
Status: DISCONTINUED | OUTPATIENT
Start: 2022-08-25 | End: 2022-08-25 | Stop reason: SDUPTHER

## 2022-08-25 RX ORDER — ONDANSETRON 2 MG/ML
INJECTION INTRAMUSCULAR; INTRAVENOUS PRN
Status: DISCONTINUED | OUTPATIENT
Start: 2022-08-25 | End: 2022-08-25 | Stop reason: SDUPTHER

## 2022-08-25 RX ORDER — NALOXONE HYDROCHLORIDE 4 MG/.1ML
1 SPRAY NASAL PRN
Qty: 1 EACH | Refills: 5 | Status: SHIPPED | OUTPATIENT
Start: 2022-08-25 | End: 2022-08-25 | Stop reason: HOSPADM

## 2022-08-25 RX ADMIN — FENTANYL CITRATE 100 MCG: 50 INJECTION, SOLUTION INTRAMUSCULAR; INTRAVENOUS at 16:02

## 2022-08-25 RX ADMIN — HYDROMORPHONE HYDROCHLORIDE 0.5 MG: 1 INJECTION, SOLUTION INTRAMUSCULAR; INTRAVENOUS; SUBCUTANEOUS at 18:39

## 2022-08-25 RX ADMIN — HYDROMORPHONE HYDROCHLORIDE 0.5 MG: 1 INJECTION, SOLUTION INTRAMUSCULAR; INTRAVENOUS; SUBCUTANEOUS at 18:34

## 2022-08-25 RX ADMIN — FENTANYL CITRATE 100 MCG: 50 INJECTION, SOLUTION INTRAMUSCULAR; INTRAVENOUS at 16:10

## 2022-08-25 RX ADMIN — HYDROMORPHONE HYDROCHLORIDE 0.25 MG: 1 INJECTION, SOLUTION INTRAMUSCULAR; INTRAVENOUS; SUBCUTANEOUS at 18:48

## 2022-08-25 RX ADMIN — ROCURONIUM BROMIDE 10 MG: 10 INJECTION INTRAVENOUS at 17:20

## 2022-08-25 RX ADMIN — LIDOCAINE HYDROCHLORIDE 50 MG: 20 INJECTION, SOLUTION INTRAVENOUS at 16:02

## 2022-08-25 RX ADMIN — MEPERIDINE HYDROCHLORIDE 12.5 MG: 25 INJECTION INTRAMUSCULAR; INTRAVENOUS; SUBCUTANEOUS at 18:48

## 2022-08-25 RX ADMIN — CEFAZOLIN 2000 MG: 2 INJECTION, POWDER, FOR SOLUTION INTRAMUSCULAR; INTRAVENOUS at 16:01

## 2022-08-25 RX ADMIN — SODIUM CHLORIDE, POTASSIUM CHLORIDE, SODIUM LACTATE AND CALCIUM CHLORIDE: 600; 310; 30; 20 INJECTION, SOLUTION INTRAVENOUS at 17:27

## 2022-08-25 RX ADMIN — HYDROMORPHONE HYDROCHLORIDE 0.25 MG: 1 INJECTION, SOLUTION INTRAMUSCULAR; INTRAVENOUS; SUBCUTANEOUS at 18:57

## 2022-08-25 RX ADMIN — PROPOFOL 200 MG: 10 INJECTION, EMULSION INTRAVENOUS at 16:02

## 2022-08-25 RX ADMIN — MIDAZOLAM HYDROCHLORIDE 2 MG: 2 INJECTION, SOLUTION INTRAMUSCULAR; INTRAVENOUS at 15:57

## 2022-08-25 RX ADMIN — HYDROMORPHONE HYDROCHLORIDE 2 MG: 2 INJECTION, SOLUTION INTRAMUSCULAR; INTRAVENOUS; SUBCUTANEOUS at 16:57

## 2022-08-25 RX ADMIN — SODIUM CHLORIDE, POTASSIUM CHLORIDE, SODIUM LACTATE AND CALCIUM CHLORIDE: 600; 310; 30; 20 INJECTION, SOLUTION INTRAVENOUS at 14:28

## 2022-08-25 RX ADMIN — ROCURONIUM BROMIDE 10 MG: 10 INJECTION INTRAVENOUS at 17:02

## 2022-08-25 RX ADMIN — SUGAMMADEX 200 MG: 100 INJECTION, SOLUTION INTRAVENOUS at 18:00

## 2022-08-25 RX ADMIN — Medication 120 MG: at 16:02

## 2022-08-25 RX ADMIN — ONDANSETRON 4 MG: 2 INJECTION INTRAMUSCULAR; INTRAVENOUS at 18:00

## 2022-08-25 RX ADMIN — ROCURONIUM BROMIDE 30 MG: 10 INJECTION INTRAVENOUS at 16:09

## 2022-08-25 RX ADMIN — MEPERIDINE HYDROCHLORIDE 12.5 MG: 25 INJECTION INTRAMUSCULAR; INTRAVENOUS; SUBCUTANEOUS at 18:57

## 2022-08-25 ASSESSMENT — PAIN DESCRIPTION - LOCATION
LOCATION: BREAST

## 2022-08-25 ASSESSMENT — PAIN DESCRIPTION - ORIENTATION
ORIENTATION: RIGHT;LEFT

## 2022-08-25 ASSESSMENT — PAIN DESCRIPTION - DESCRIPTORS
DESCRIPTORS: ACHING

## 2022-08-25 ASSESSMENT — PAIN SCALES - GENERAL
PAINLEVEL_OUTOF10: 0
PAINLEVEL_OUTOF10: 0
PAINLEVEL_OUTOF10: 5
PAINLEVEL_OUTOF10: 0
PAINLEVEL_OUTOF10: 4
PAINLEVEL_OUTOF10: 2
PAINLEVEL_OUTOF10: 10
PAINLEVEL_OUTOF10: 8

## 2022-08-25 ASSESSMENT — PAIN - FUNCTIONAL ASSESSMENT: PAIN_FUNCTIONAL_ASSESSMENT: 0-10

## 2022-08-25 NOTE — ANESTHESIA PRE PROCEDURE
Department of Anesthesiology  Preprocedure Note       Name:  Beronica Lovett   Age:  62 y.o.  :  1960                                          MRN:  [id number]         Date:  2022      Surgeon: Dionne Wall):  Arminda Reyna MD    Procedure: Procedure(s):  EXCISION OF BILATERAL GYNECOMASTIA WITH FREE NIPPLE GRAFTING    Medications prior to admission:   Prior to Admission medications    Medication Sig Start Date End Date Taking? Authorizing Provider   pioglitazone (ACTOS) 30 MG tablet Take 30 mg by mouth daily   Yes Historical Provider, MD   POTASSIUM CHLORIDE PO Take by mouth   Yes Historical Provider, MD   GLIPIZIDE PO Take 5 mg by mouth daily   Yes Historical Provider, MD   Nutritional Supplements (VITAMIN D MAINTENANCE PO) Take by mouth   Yes Historical Provider, MD   Calcium Carbonate Antacid (CALCIUM CARBONATE PO) Take by mouth   Yes Historical Provider, MD   oxyCODONE-acetaminophen (PERCOCET) 5-325 MG per tablet Take 1 tablet by mouth every 4 hours as needed for Pain.    Yes Historical Provider, MD   abiraterone acetate (ZYTIGA) 250 MG tablet Take 4 tablets by mouth once daily on an empty stomach (1 hour before or 2 hours after eating)  Patient not taking: Reported on 2022   Historical Provider, MD   PROLIA 60 MG/ML SOSY SC injection Bring to office for injection of 1 syringeful under the skin once every 6 months 21   Historical Provider, MD   predniSONE (DELTASONE) 5 MG tablet TAKE ONE TABLET BY MOUTH EVERY DAY WITH FOOD  Patient not taking: Reported on 2022   Historical Provider, MD   triptorelin (TRELSTAR MIXJECT) 11.25 MG SUSR bring TO office FOR injection of ONE syringeful into muscle EVERY THREE MONTHS 21   Historical Provider, MD   aspirin 81 MG chewable tablet Take 1 tablet by mouth daily 18   Dwain Burr MD   carvedilol (COREG) 3.125 MG tablet Take 1 tablet by mouth 2 times daily (with meals) 18   Dwain Burr MD   amLODIPine (NORVASC) 5 MG glasses        Past Surgical History:        Procedure Laterality Date    HIP SURGERY Left     FUSION    KNEE SURGERY Right     NECK SURGERY      w/ screw    PROSTATECTOMY N/A [id number]    SHOULDER SURGERY      x2    WRIST TENOLYSIS      x2       Social History:    Social History     Tobacco Use    Smoking status: Former     Types: Cigarettes     Quit date: 1982     Years since quittin.3    Smokeless tobacco: Never   Substance Use Topics    Alcohol use: Yes     Comment: 1-2 drinks 4-5 x a week -liquor                                Counseling given: Not Answered      Vital Signs (Current):   Vitals:    22 0946   Weight: 205 lb (93 kg)   Height: 5' 8\" (1.727 m)                                              BP Readings from Last 3 Encounters:   22 137/84   10/11/21 (!) 163/80   18 134/80       NPO Status:                                                                                 BMI:   Wt Readings from Last 3 Encounters:   22 205 lb (93 kg)   22 205 lb (93 kg)   22 205 lb 3.2 oz (93.1 kg)     Body mass index is 31.17 kg/m². CBC:   Lab Results   Component Value Date/Time    WBC 6.6 10/11/2021 04:35 PM    RBC 4.08 10/11/2021 04:35 PM    HGB 12.9 10/11/2021 04:35 PM    HCT 38.2 10/11/2021 04:35 PM    MCV 93.7 10/11/2021 04:35 PM    RDW 13.8 10/11/2021 04:35 PM     10/11/2021 04:35 PM       CMP:   Lab Results   Component Value Date/Time     10/11/2021 04:35 PM    K 4.1 10/11/2021 04:35 PM     10/11/2021 04:35 PM    CO2 20 10/11/2021 04:35 PM    BUN 14 10/11/2021 04:35 PM    CREATININE 0.7 10/11/2021 04:35 PM    GFRAA >60 10/11/2021 04:35 PM    LABGLOM >60 10/11/2021 04:35 PM    GLUCOSE 242 10/11/2021 04:35 PM    CALCIUM 9.5 10/11/2021 04:35 PM       POC Tests: No results for input(s): POCGLU, POCNA, POCK, POCCL, POCBUN, POCHEMO, POCHCT in the last 72 hours.     Coags: No results found for: PROTIME, INR, APTT    HCG (If Applicable): No results found for: PREGTESTUR, PREGSERUM, HCG, HCGQUANT     ABGs: No results found for: PHART, PO2ART, LVT3GHS, NRH2MLO, BEART, T6PDQHZU     Type & Screen (If Applicable):  No results found for: LABABO, LABRH    Drug/Infectious Status (If Applicable):  No results found for: HIV, HEPCAB    COVID-19 Screening (If Applicable): No results found for: COVID19        Anesthesia Evaluation  Patient summary reviewed and Nursing notes reviewed   history of anesthetic complications: PONV. Airway: Mallampati: II  TM distance: >3 FB   Neck ROM: full  Mouth opening: > = 3 FB   Dental: normal exam         Pulmonary:normal exam                               Cardiovascular:  Exercise tolerance: good (>4 METS),   (+) hypertension:,     (-)  angina, orthopnea and PND    ECG reviewed  Rhythm: regular  Rate: normal           Beta Blocker:  Dose within 24 Hrs         Neuro/Psych:               GI/Hepatic/Renal:             Endo/Other:    (+) DiabetesType II DM, , .                 Abdominal:   (+) obese,           Vascular: Other Findings:           Anesthesia Plan      general     ASA 3     (Patient ate steak and eggs this AM @7 am. Will be NPO appropriate at 3pm this afternoon.)  Induction: intravenous. MIPS: Postoperative opioids intended and Prophylactic antiemetics administered. Anesthetic plan and risks discussed with patient. Plan discussed with CRNA and attending.                     Jaqui Carpio DO   8/25/2022

## 2022-08-25 NOTE — PROGRESS NOTES
Dr. Nikki Arreola aware that patient had breakfast at 0700 am of 2 eggs. No new orders, OK to continue with procedure as long as it is past 3:00 pm per Dr. Nikki Arreola.

## 2022-08-25 NOTE — DISCHARGE INSTRUCTIONS
driving while on pain medication or Valium® or Flexoril® . - Additionally do NOT take any of the following products:    Aspirin/low-dose aspirin    Ibuprofen (Advil®, Motrin®    Naprosyn or Naproxen (Aleve®)    Vitamin E (even small amounts in multiple vitamins)    Herbals or homeopathic medicines or green tea    Growth hormone    Diet pills (Meridia®, Metabolife®, etc)         - You will also be prescribed an antibiotic, make sure to complete the prescribed course. You can also take a probiotic yogurt (Activia®) to help with your bowel function while on these medications. - Take your medications as prescribed. Antibiotic to prevent infection:    Pain medication, if needed:    Valium to prevent muscle spasm:    Other: Lactobacillus (Culturelle) probiotic while taking antibiotics. You can obtain this over the counter or within any yogurt that specifically has the active ingredient: L. acidophilus (Lactobacillus acidophilus). Wound Care  - You will have some swelling or bruising of the breasts and upper abdomen. This is normal and will lessen over the next 1 to 3 weeks. - You may notice a change in sensation or numbness of the breast skin. This is common after surgery and should improve gradually over time. - You can resume daily showers in 24 hours, but avoid very hot water. You can allow soapy water to run over your breast and then after showering, pat the breast dry. Do not scrub or wipe the reconstructed breast until instructed (usually 6-8 weeks). - Do NOT submerge your incisions (e.g. no swimming or baths submerging breasts until instructed)  - A bra should be worn at all times day and night after surgery. The bra should not be tight, have under wires, or have strong elastic. You will receive a surgical bra from the hospital stay as well as an additional clean bra to take home. Wear these bras for the first week until your follow-up appointment.   - If you have an ACE wrap, keep this in place for the first 24 hours and then switch to a compression garment, which will be worn at all times until follow-up. - Abdominal bruising and swelling is normal and this will subside in 3-6 months. Follow-up  Call your doctor's office at the first sign of:  Excessive worsening pain associated with pressure of the breast.   Enlargement of the breast area (eggplant color or worsening bruising). Bleeding at the incision. Redness, drainage or odor from the incisions. Fever or chills. Shortness of breath. Do not hesitate to call if you have any questions or concerns. [address] [facility]    There are potential side effects of anesthesia or sedation you may experience for the first 24 hours. These side effects include:    Confusion or Memory loss, Dizziness, or Delayed Reaction Times   [x]A responsible person should be with you for the next 24 hours. Do not operate any vehicles (automobiles, bicycles, motorcycles) or power tools or machinery for 24 hours. Do not sign any legal documents or make any legal decisions for 24 hours. Do not drink alcohol for 24 hours or while taking narcotic pain medication. Nausea    [x]Start with light diet and progress to your normal diet as you feel like eating. However, if you experience nausea or repeated episodes of vomiting which persist beyond 12-24 hours, notify your physician. Once nausea has passed, remember to keep drinking fluids. Difficulty Passing Urine  [x]Drink extra amounts of fluid today. Notify your physician if you have not urinated within 8 hours after your procedure or you feel uncomfortable. Irritated Throat from a Breathing Tube  [x]Drink extra amounts of fluid today. Lozenges may help. Muscle Aches  [x]You may experience some generalized body aches as your muscles recover from medications used to relax them during surgery. These will gradually subside.     MEDICATION INSTRUCTIONS:  [x]Prescription(S) sent with you. Use as directed. When taking pain medications, you may experience the side effect of dizziness or drowsiness. Do not drink alcohol or drive when taking these medications. []Prescription(S) x called to Pharmacy Name and location:    [x]Give the list of your medications to your primary care physician on your next visit. Keep your med list updated and carry it with in case of emergencies. [] Narcotic pain medications can cause the side effect of significant constipation. You may want to add a stool softener to your postoperative medication schedule or speak to your surgeon on how best to manage this side effect. NARCOTIC SAFETY:  Your pain medicine is only for you to take. Safely store your medicines. Store pills up high and out of reach of children and pets. Ensure safety caps are snapped tightly  Keep track of how many pills you have left    Unused medication can be disposed of by taking them to a drop-off box or take-back program that is authorized by the [facility]. Access to a site near you can be found on the [facility] [employer] [facility] website ([address] [facility]). If you have a CPAP machine, it is very important that you use it daily during all periods of sleep and daytime rest during your recovery at home. Surgery and Anesthesia place a significant amount of stress on your body. Using your CPAP will help keep you safe and lessen the negative effects of that stress. FOLLOW-UP RECOVERY CARE:  [x]Call the office at [phone] for follow-up appointment and problems    Watch for these possible complications, symptoms, or side effects of anesthesia. Call physician if they or any other problems occur:  Signs of INFECTION   > Fever over 101°     > Redness, swelling, hardness or warmth at the operative site   >Foul smelling or cloudy drainage at the operative site   Unrelieved PAIN  Unrelieved NAUSEA  Blood soaked dressing.   (Some oozing may be normal)  Inability to urinate      Numb, pale, blue, cold or tingling extremity      Physician:  Joe Mckenzie    The above instructions were reviewed with patient/significant other. The following additional patient specific information was reviewed with the patient/significant other:  [x]Procedure/physician specific instructions  [x]Medication information sheet(S) including potential side effects  []Josis egress test  []Pain Ball management  []FAQ Catheter associated blood stream infections  []FAQ Surgical Site Infections  []Other-    I have read and understand the instructions given to me: ____________________________________________   (Patient/S.O. Signature)            Date/time 8/25/2022 5:57 PM         PACU:  [phone]   M-F 700 AM - 7 PM      SAME DAY SERVICES:  [phone] M-F 7AM-6PM        If you smoke STOP. We care about your health!

## 2022-08-25 NOTE — ANESTHESIA POSTPROCEDURE EVALUATION
Department of Anesthesiology  Postprocedure Note    Patient: Cinthia Patterson  MRN: [id number]  YOB: 1960  Date of evaluation: 8/25/2022      Procedure Summary     Date: 08/25/22 Room / Location: [facility] [address] [facility]    Anesthesia Start: 5444 Anesthesia Stop: 9767    Procedure: EXCISION OF BILATERAL GYNECOMASTIA WITH FREE NIPPLE GRAFTING (Bilateral) Diagnosis:       Gynecomastia      (Gynecomastia Fatmata Colonel)    Surgeons: Wilton Perez MD Responsible Provider:     Anesthesia Type: general ASA Status: 3          Anesthesia Type: No value filed.     Akil Phase I: Akil Score: 10    Akil Phase II:        Anesthesia Post Evaluation    Patient location during evaluation: PACU  Patient participation: complete - patient participated  Level of consciousness: awake and alert  Pain score: 2  Airway patency: patent  Nausea & Vomiting: no nausea and no vomiting  Complications: no  Cardiovascular status: hemodynamically stable  Respiratory status: acceptable  Hydration status: stable

## 2022-08-25 NOTE — PROGRESS NOTES
Pt received from OR to PACU # 10 via stretcher. Post:  Excision bilateral gynocomastia with free nipple grafting     Report received from Dr. Linus Tolbert and Stone Katz CRNA. Per report Pt has Prineo, fluff and ace wrap around chest with DENISHA to each side. Respirations reg and easy. Pt is resting. Attached to PACU monitoring system. Alarms and parameters set    Pain none noted and denies complaints of nausea.

## 2022-08-25 NOTE — BRIEF OP NOTE
Brief Postoperative Note      Patient: Nader Hardy  YOB: 1960  MRN: [id number]    Date of Procedure: 8/25/2022    Pre-Op Diagnosis: Gynecomastia [N62]    Post-Op Diagnosis: Same       Procedure(s):  EXCISION OF BILATERAL GYNECOMASTIA WITH FREE NIPPLE GRAFTING    Surgeon(s):  Cheri Broderick MD    Assistant:  Surgical Assistant: Mendy Rollins    Anesthesia: General    Estimated Blood Loss (mL): less than 50     Complications: None    Specimens:   ID Type Source Tests Collected by Time Destination   A : RIGHT BREAST TISSUE Tissue Breast SURGICAL PATHOLOGY Cheri Broderick MD 8/25/2022 1733    B : LEFT BREAST TISSUE Tissue Breast SURGICAL PATHOLOGY Cheri Broderick MD 8/25/2022 1734        Implants:  * No implants in log *      Drains:   Closed/Suction Drain Inferior;Right Breast (Active)       Closed/Suction Drain Inferior; Left Breast Accordion (Active)       Findings: bilateral gynecomastia excision with free nipple grafting; bilateral DENISHA drains in place    Electronically signed by Yudy Almonte DO on 8/25/2022 at 6:14 PM

## 2022-08-26 ENCOUNTER — TELEPHONE (OUTPATIENT)
Dept: SURGERY | Age: 62
End: 2022-08-26

## 2022-08-26 NOTE — OP NOTE
[place] [address] SURGERY     OPERATIVE DICTATION    NAME: Foster Petit   MRN: [id number]  DATE: 08/25/22     AGE: 62 y.o. LOCATION: [address] DIAGNOSIS:  Gynecomastia     POSTOPERATIVE DIAGNOSIS:  Same. OPERATION PERFORMED: 1) Excision of bilateral extensive gynecomastia      2) Free nipple grafting     SURGEON:  Leslie Mcmillan MD    ASSISTANT: Bruno Alexander ()     ANESTHESIA: General     ESTIMATED BLOOD LOSS:  75 cc     DRAINS:  2 (1 15F round in each chest)     SPECIMENS: R chest: 871.1 grams; L chest: 611.5 grams     OPERATIVE INDICATIONS:  This is a 62 y.o. male who presented to the office in consultation for grade 3 gynecomastia. The patient desired excision. We discussed multiple options and after discussion, he desired to move forward with full mastectomy with free nipple grafting. A thorough discussion regarding the risks, benefits, alternatives, outcomes, and personnel involved was performed with the patient. After all questions were answered to the patient's satisfaction, they agreed to proceed. OPERATIVE PROCEDURE:  The patient was marked in the preoperative holding area and then brought to the operating room and placed in the supine position on the operating room table. He underwent general anesthesia and was prepped and draped in the usual sterile manner. A time-out was performed confirming the patient and the procedure to be performed. The operation commenced by infiltration tumescent solution into the chest. The nipples were then elevated as grafts and placed on the backtable to be defatted and perforated. Attention was then directed to the right chest.  A double incision ellipse was then performed. The tissue was dissected from the anterior flaps keeping the significant breast tissue down. The tissue was then dissected free from the pectoralis major muscle and sent to pathology as specimen.   The same procedure was then performed on the contralateral left side. Once completed, hemostasis was obtained with electrocautery, clips, and Surgicel powder. A 15F drain was brought out through separate stab incisions on both chest and secured in place using 3-0 Nylon sutures. The chest was then tailor tacked and closed in layers using 2-0 Vicryl followed by 3-0 Stratafix sutures. The patient was placed in the sitting position, and nipple position was marked. The patient was placed back supine, and the skin was de-epithelialized to allow for graft healing. The grafts were then sutured into position using 4-0 Chromic sutures followed by bolster dressings. Sterile dressings were then applied. .      The patient was then awakened and taken to the PACU in stable condition. There were no immediate complications and the patient tolerated the procedure well. At the end of the case, all counts were correct.     Carolyn Reaves MD

## 2022-08-26 NOTE — PROGRESS NOTES
Ambulatory Surgery/Procedure Discharge Note    Vitals:    08/25/22 2000   BP: (!) 154/79   Pulse: 81   Resp: 15   Temp: 97.2 °F (36.2 °C)   SpO2: 93%       In: -   Out: 30 [Drains:30]    Restroom use offered before discharge. Yes    Pain assessment:  level of pain (1-10, 10 severe),   Pain Level: 0    Patient urinated unmeasured amount, ambulated with stand by assist to restroom    Patient discharged to home/self care.  Patient discharged via wheel chair by transporter to waiting family/S.O.       8/25/2022 8:24 PM

## 2022-08-26 NOTE — TELEPHONE ENCOUNTER
I spoke with patient this PM.  Patient  stated they are doing well after surgery. Patient stated that pain is manageable and their intake is adequate. Postoperative instructions reinforced, including use of ace wrap. All questions answered. Patient confirmed that they would call with any issues or problems. Follow up on Thursday, 9/1/2022.

## 2022-08-27 PROBLEM — N62 GYNECOMASTIA: Status: ACTIVE | Noted: 2022-08-27

## 2022-09-01 ENCOUNTER — OFFICE VISIT (OUTPATIENT)
Dept: SURGERY | Age: 62
End: 2022-09-01

## 2022-09-01 VITALS
DIASTOLIC BLOOD PRESSURE: 82 MMHG | HEART RATE: 81 BPM | SYSTOLIC BLOOD PRESSURE: 149 MMHG | WEIGHT: 209 LBS | OXYGEN SATURATION: 98 % | BODY MASS INDEX: 31.78 KG/M2 | TEMPERATURE: 97.3 F

## 2022-09-01 DIAGNOSIS — Z09 POSTOP CHECK: Primary | ICD-10-CM

## 2022-09-01 PROCEDURE — 99024 POSTOP FOLLOW-UP VISIT: CPT

## 2022-09-01 RX ORDER — BACITRACIN ZINC AND POLYMYXIN B SULFATE 500; 1000 [USP'U]/G; [USP'U]/G
OINTMENT TOPICAL
Qty: 15 G | Refills: 1 | Status: SHIPPED | OUTPATIENT
Start: 2022-09-01 | End: 2022-09-08

## 2022-09-01 NOTE — PATIENT INSTRUCTIONS
Apply bacitracin to nipple grafts then apply xeroform then wrap with ace wrap daily. Return in 2 weeks for follow up.

## 2022-09-01 NOTE — PROGRESS NOTES
[facility]    PROCEDURE:  1) Excision of bilateral extensive gynecomastia                                                  2) Free nipple grafting  DATE: 8/25/22    Kd Rajan has been recovering well since his procedure. Pain has been well controlled with intermittent pain medications He presents today for a 1 week follow up. EXAM    BP (!) 149/82   Pulse 81   Temp 97.3 °F (36.3 °C)   Wt 209 lb (94.8 kg)   SpO2 98%   BMI 31.78 kg/m²       GEN: NAD   CHEST:  Incision healing appropriately. No hematoma/seroma. Drains serosang. PATHOLOGY: FINAL DIAGNOSIS:        A. Skin and breast tissue, right:      890 g aggregate of skin and breast tissue with breast showing abundant        adipose tissue, cannot exclude lipoma with foci of gynecomastia. Overlying skin ellipse with nonspecific reactive changes. No significant epithelial atypia or evidence of malignancy. B. Skin and breast tissue, left:      630 g aggregate of skin and breast tissue with breast showing        prominent adipose tissue, cannot exclude lipoma. Overlying skin ellipse with reactive changes. No significant epithelial atypia or evidence of malignancy. IMP: 62 y.o.male s/p excision of bilateral extensive gynecomastia with free nipple  grafting  PLAN: Bilateral drains removed. Bolser dressing removed. Will follow up 2 weeks to ensure wound. Will continue local wound care with bacitracin and xeroform.        Elan Giraldo, APRN - [address] [facility] Reconstructive Surgery  [phone]  09/01/22

## 2022-09-07 ENCOUNTER — TELEPHONE (OUTPATIENT)
Dept: SURGERY | Age: 62
End: 2022-09-07

## 2022-09-07 NOTE — TELEPHONE ENCOUNTER
Patient called asking how long many hours a day he needs to wear his ace wrap. Also he would like to know how much longer he is going to have to wear it.     Please call: [phone]

## 2022-09-07 NOTE — TELEPHONE ENCOUNTER
Instructed Erlin that it is necessaru to wear the ace wrap on his chest after gynecomastia surgery 24 hours a day/ 7 days a week for 6 weeks. Activity restrictions reinforced. Explained that all these restrictions are to prevent a seroma and promote wound healing. Erlin verbalized understanding.

## 2022-09-13 ENCOUNTER — HOSPITAL ENCOUNTER (OUTPATIENT)
Dept: NUCLEAR MEDICINE | Age: 62
Discharge: HOME | End: 2022-09-13
Payer: COMMERCIAL

## 2022-09-13 DIAGNOSIS — C61 PROSTATE CANCER (HCC): ICD-10-CM

## 2022-09-13 PROCEDURE — 78306 BONE IMAGING WHOLE BODY: CPT | Performed by: UROLOGY

## 2022-09-13 PROCEDURE — 3430000000 HC RX DIAGNOSTIC RADIOPHARMACEUTICAL: Performed by: UROLOGY

## 2022-09-13 PROCEDURE — A9503 TC99M MEDRONATE: HCPCS | Performed by: UROLOGY

## 2022-09-13 RX ORDER — TC 99M MEDRONATE 20 MG/10ML
25 INJECTION, POWDER, LYOPHILIZED, FOR SOLUTION INTRAVENOUS
Status: COMPLETED | OUTPATIENT
Start: 2022-09-13 | End: 2022-09-13

## 2022-09-13 RX ADMIN — TC 99M MEDRONATE 25 MILLICURIE: 20 INJECTION, POWDER, LYOPHILIZED, FOR SOLUTION INTRAVENOUS at 09:12

## 2022-09-15 ENCOUNTER — OFFICE VISIT (OUTPATIENT)
Dept: SURGERY | Age: 62
End: 2022-09-15

## 2022-09-15 VITALS
DIASTOLIC BLOOD PRESSURE: 79 MMHG | OXYGEN SATURATION: 97 % | TEMPERATURE: 97.5 F | HEART RATE: 81 BPM | BODY MASS INDEX: 31.78 KG/M2 | SYSTOLIC BLOOD PRESSURE: 142 MMHG | WEIGHT: 209 LBS

## 2022-09-15 DIAGNOSIS — Z09 POSTOP CHECK: Primary | ICD-10-CM

## 2022-09-15 PROCEDURE — 99024 POSTOP FOLLOW-UP VISIT: CPT

## 2022-09-15 NOTE — PROGRESS NOTES
[facility]    PROCEDURE:   1) Excision of bilateral extensive gynecomastia                                                  2) Free nipple grafting  DATE: 8/25/22    Josie Hammans has been recovering satisfactorily since his procedure. Pain has been well controlled without pain medications. He presents today for a wound check. EXAM    BP (!) 142/79   Pulse 81   Temp 97.5 °F (36.4 °C)   Wt 209 lb (94.8 kg)   SpO2 97%   BMI 31.78 kg/m²       GEN: NAD   CHEST: Incision healing appropriately. No hematoma/seroma. Graft has taken 95 %. IMP: 62 y.o.male s/p excision of bilateral extensive gynecomastia with free nipple  grafting  PLAN: Doing well overall. Will continue local wound care. Will follow up in 1 month to ensure wound healing. Will continue restrictions and compression until 6 weeks. Will call with any concerns in the interim.        Jenna Brice, APRN - [address] [facility] Reconstructive Surgery  [phone]  09/15/22

## 2022-09-15 NOTE — PATIENT INSTRUCTIONS
Continue xeroform and bacitracin daily, leave open to air if at home. Wear compression when moving around. Follow up in 3 weeks in office. Continue with restrictions of no lifting over 5 pounds, no pushing/pulling, no strenuous exercise, walking around is fine. Call with any fever or chills or concerns. Call with any concerns.   Take care   [place]

## 2022-09-19 ENCOUNTER — TELEPHONE (OUTPATIENT)
Dept: SURGERY | Age: 62
End: 2022-09-19

## 2022-09-19 NOTE — TELEPHONE ENCOUNTER
Returned call to patient to obtain more information. Patient states that he feels an area that he describes as a end of an eraser. Discussed with patient that this can be common with fat necrosis. Patient has no additional symptoms. No fevers, chills, swelling, redness or warmth. No concerns at incision. Patient will call office with any additional or added symptoms and understands that next steps are imaging.  DONE

## 2022-09-19 NOTE — TELEPHONE ENCOUNTER
Patient called in stating that he has noticed a lump on the side of his right breast and would like to speak to the clinical team     Patient states that he noticed the lump about 3 days ago-    Please contact the patient at [phone]

## 2022-10-05 ENCOUNTER — OFFICE VISIT (OUTPATIENT)
Dept: SURGERY | Age: 62
End: 2022-10-05

## 2022-10-05 VITALS
OXYGEN SATURATION: 98 % | HEIGHT: 68 IN | WEIGHT: 207 LBS | SYSTOLIC BLOOD PRESSURE: 133 MMHG | BODY MASS INDEX: 31.37 KG/M2 | RESPIRATION RATE: 19 BRPM | TEMPERATURE: 97 F | DIASTOLIC BLOOD PRESSURE: 79 MMHG | HEART RATE: 84 BPM

## 2022-10-05 DIAGNOSIS — Z09 POSTOP CHECK: Primary | ICD-10-CM

## 2022-10-05 PROCEDURE — 99024 POSTOP FOLLOW-UP VISIT: CPT | Performed by: SURGERY

## 2022-10-05 NOTE — PROGRESS NOTES
[facility]    PROCEDURE: 1) Excision of bilateral extensive gynecomastia                            2) Free nipple grafting  DATE: 8/25/22    Parmjit Lester has been recovering satisfactorily since his procedure. Pain has been well controlled without pain medications. EXAM    /79   Pulse 84   Temp 97 °F (36.1 °C) (Temporal)   Resp 19   Ht 5' 8\" (1.727 m)   Wt 207 lb (93.9 kg)   SpO2 98%   BMI 31.47 kg/m²     GEN: NAD   CHEST: Incision well healed with improved contour from preop  Nipples viable with areolar grafting with lack of pigment    IMP: 61 y.o.male s/p bilateral extensive gynecomastia resection  PLAN: Would benefit from nipple tattooing for improvement in pigment. He was referred for this and will return in 6 months.      Demarco Howard MD  [address] [place] [address] Reconstructive Surgery  [phone]  10/05/22

## 2022-11-04 ENCOUNTER — HOSPITAL ENCOUNTER (OUTPATIENT)
Dept: ULTRASOUND IMAGING | Age: 62
Discharge: HOME | End: 2022-11-04
Payer: COMMERCIAL

## 2022-11-04 DIAGNOSIS — N64.89 SEROMA OF BREAST: ICD-10-CM

## 2022-11-04 DIAGNOSIS — N63.10 MASS OF RIGHT BREAST, UNSPECIFIED QUADRANT: ICD-10-CM

## 2022-11-04 PROCEDURE — 76642 ULTRASOUND BREAST LIMITED: CPT

## 2022-11-04 PROCEDURE — 2709999900 US ASP ABSCESS/HEMATOMA/BULLA/CYST

## 2022-12-20 ENCOUNTER — OFFICE VISIT (OUTPATIENT)
Dept: SURGERY | Age: 62
End: 2022-12-20
Payer: COMMERCIAL

## 2022-12-20 VITALS
HEART RATE: 85 BPM | SYSTOLIC BLOOD PRESSURE: 134 MMHG | OXYGEN SATURATION: 98 % | BODY MASS INDEX: 32.23 KG/M2 | DIASTOLIC BLOOD PRESSURE: 78 MMHG | TEMPERATURE: 97.2 F | WEIGHT: 212 LBS

## 2022-12-20 DIAGNOSIS — N62 GYNECOMASTIA: ICD-10-CM

## 2022-12-20 DIAGNOSIS — N64.89 SEROMA OF BREAST: Primary | ICD-10-CM

## 2022-12-20 PROCEDURE — 3074F SYST BP LT 130 MM HG: CPT

## 2022-12-20 PROCEDURE — 1036F TOBACCO NON-USER: CPT

## 2022-12-20 PROCEDURE — G8484 FLU IMMUNIZE NO ADMIN: HCPCS

## 2022-12-20 PROCEDURE — 3078F DIAST BP <80 MM HG: CPT

## 2022-12-20 PROCEDURE — 3017F COLORECTAL CA SCREEN DOC REV: CPT

## 2022-12-20 PROCEDURE — G8417 CALC BMI ABV UP PARAM F/U: HCPCS

## 2022-12-20 PROCEDURE — 99213 OFFICE O/P EST LOW 20 MIN: CPT

## 2022-12-20 PROCEDURE — G8428 CUR MEDS NOT DOCUMENT: HCPCS

## 2022-12-20 NOTE — PROGRESS NOTES
[facility]    PROCEDURE:  1) Excision of bilateral extensive gynecomastia                            2) Free nipple grafting  DATE: 22    Dennys Khan has been recovering poorly since his procedure. Pain has been well controlled without pain medications. Since his last evaluation he noticed that he had swelling on the left chest wall 1 month ago, he previous had an ultrasound and aspiration on 22 done for post surgical seroma or Dr. Sohan Tabares on the right chest wall where they were able to aspirate 50 ml of serous fluid. He also states that he has tenderness and noticed a \"lump\" in his right chest wall.      PMHx:   Past Medical History:   Diagnosis Date    Anxiety     Cancer ([facility] [address]     prostate    Diabetes mellitus ([facility] [address]     H/O cardiovascular stress test     ? 2016 at [facility] AT [facility]    H/O coronary angiogram     years ago at [facility]    Hyperlipidemia     Hypertension     PONV (postoperative nausea and vomiting)     Wears glasses      PSHx:   Past Surgical History:   Procedure Laterality Date    BREAST SURGERY Bilateral 2022    EXCISION OF BILATERAL GYNECOMASTIA WITH FREE NIPPLE GRAFTING performed by Parth Jeffries MD at [facility]. Right     NECK SURGERY      w/ screw    PROSTATECTOMY N/A [id number]    SHOULDER SURGERY      x2    US ASP ABSCESS/HEMATOMA/BULLA/CYST  2022    US ASP ABSCESS/HEMATOMA/BULLA/CYST 2022 Mortimer Kinds, MD [facility] ULTRASOUND    WRIST TENOLYSIS      x2     Allergy: No Known Allergies    SHx:   Social History     Socioeconomic History    Marital status:      Spouse name: Not on file    Number of children: Not on file    Years of education: Not on file    Highest education level: Not on file   Occupational History    Not on file   Tobacco Use    Smoking status: Former     Types: Cigarettes     Quit date: 1982     Years since quittin.6    Smokeless tobacco: Never   Vaping Use    Vaping Use: Never used   Substance and Sexual Activity    Alcohol use: Yes     Comment: 1-2 drinks 4-5 x a week -liquor    Drug use: No    Sexual activity: Yes     Partners: Female   Other Topics Concern    Not on file   Social History Narrative    Not on file     Social Determinants of Health     Financial Resource Strain: Not on file   Food Insecurity: Not on file   Transportation Needs: Not on file   Physical Activity: Not on file   Stress: Not on file   Social Connections: Not on file   Intimate Partner Violence: Not on file   Housing Stability: Not on file     FHx: Family history reviewed and is noncontributory  Meds:   Current Outpatient Medications   Medication Sig Dispense Refill    pioglitazone (ACTOS) 30 MG tablet Take 30 mg by mouth daily      POTASSIUM CHLORIDE PO Take by mouth      GLIPIZIDE PO Take 5 mg by mouth daily      Nutritional Supplements (VITAMIN D MAINTENANCE PO) Take by mouth      Calcium Carbonate Antacid (CALCIUM CARBONATE PO) Take by mouth      oxyCODONE-acetaminophen (PERCOCET) 5-325 MG per tablet Take 1 tablet by mouth every 4 hours as needed for Pain.       abiraterone acetate (ZYTIGA) 250 MG tablet Take 4 tablets by mouth once daily on an empty stomach (1 hour before or 2 hours after eating) (Patient not taking: No sig reported)      PROLIA 60 MG/ML SOSY SC injection Bring to office for injection of 1 syringeful under the skin once every 6 months      predniSONE (DELTASONE) 5 MG tablet TAKE ONE TABLET BY MOUTH EVERY DAY WITH FOOD (Patient not taking: No sig reported)      triptorelin (TRELSTAR MIXJECT) 11.25 MG SUSR bring TO office FOR injection of ONE syringeful into muscle EVERY THREE MONTHS      aspirin 81 MG chewable tablet Take 1 tablet by mouth daily 30 tablet 0    carvedilol (COREG) 3.125 MG tablet Take 1 tablet by mouth 2 times daily (with meals) 60 tablet 0    amLODIPine (NORVASC) 5 MG tablet Take 5 mg by mouth daily      atorvastatin (LIPITOR) 40 MG tablet Take 40 mg by mouth daily hydrochlorothiazide (HYDRODIURIL) 25 MG tablet Take 25 mg by mouth daily      lansoprazole (PREVACID) 30 MG delayed release capsule Take 30 mg by mouth daily      albuterol sulfate  (90 Base) MCG/ACT inhaler Inhale 2 puffs into the lungs every 6 hours as needed for Wheezing (Patient not taking: No sig reported)      lorazepam (ATIVAN) 0.5 MG tablet Take 1 mg by mouth every 12 hours as needed. metformin (GLUCOPHAGE) 500 MG tablet Take 1,000 mg by mouth daily (with breakfast)       No current facility-administered medications for this visit. ROS   Constitutional: Negative for chills and fever. HENT: Negative for congestion, facial swelling, and voice change. Eyes: Negative for photophobia and visual disturbance. Respiratory: Negative for apnea, cough, chest tightness and shortness of breath. Cardiovascular: Negative for chest pain and palpitations. Gastrointestinal: Negative for dysphagia and early satiety. Genitourinary: Negative for difficulty urinating, dysuria, flank pain, frequency and hematuria. Musculoskeletal: Negative for new gait problem, joint swelling and myalgias. Skin: Negative for color change, pallor and rash. Endocrine: negative for tremors, temperature intolerance or polydipsia. Allergic/Immunologic: Negative for new environmental or food allergies. Neurological: Negative for dizziness, seizures, speech difficulty, numbness. Hematological: Negative for adenopathy. Psychiatric/Behavioral: Negative for agitation and confusion. EXAM    /78   Pulse 85   Temp 97.2 °F (36.2 °C)   Wt 212 lb (96.2 kg)   SpO2 98%   BMI 32.23 kg/m²       GEN: NAD   CHEST:   Incision well healed with improved contour from preop                  Nipples viable with areolar grafting with lack of pigment. A possible seroma on left chest wall.       IMP: 62 y.o.male s/p bilateral extensive gynecomastia resection  PLAN: Chest wall ultrasound ordered of bilateral breast. Will await results and following up after imaging with next steps. Will call with any concerns in the interim.      Keiry So, APRN - [id number] Reconstructive Surgery  [phone]  12/20/22

## 2022-12-30 ENCOUNTER — HOSPITAL ENCOUNTER (OUTPATIENT)
Dept: ULTRASOUND IMAGING | Age: 62
Discharge: HOME | End: 2022-12-30
Payer: COMMERCIAL

## 2022-12-30 DIAGNOSIS — N62 GYNECOMASTIA: ICD-10-CM

## 2022-12-30 DIAGNOSIS — N64.89 SEROMA OF BREAST: ICD-10-CM

## 2022-12-30 PROCEDURE — 76642 ULTRASOUND BREAST LIMITED: CPT

## 2022-12-30 PROCEDURE — 76641 ULTRASOUND BREAST COMPLETE: CPT

## 2023-01-03 ENCOUNTER — TELEPHONE (OUTPATIENT)
Dept: SURGERY | Age: 63
End: 2023-01-03

## 2023-01-03 NOTE — TELEPHONE ENCOUNTER
----- Message from GEOVANY Delgado CNP sent at 1/2/2023  6:47 PM EST -----  Please let him know his chest ultrasound was normal and just showed some fat necrosis which is common following surgery. He did not have any seromas which would require aspiration. If he is still concerned we can scheduled him to follow up with Dr. Silvia White sooner.    [place]  ----- Message -----  From: Elda Abad Incoming Radiology Results From [facility]  Sent: 12/30/2022  10:35 AM EST  To: GEOVANY Delgado CNP

## 2023-01-11 ENCOUNTER — OFFICE VISIT (OUTPATIENT)
Dept: SURGERY | Age: 63
End: 2023-01-11
Payer: COMMERCIAL

## 2023-01-11 VITALS
WEIGHT: 215 LBS | TEMPERATURE: 97 F | BODY MASS INDEX: 32.58 KG/M2 | HEART RATE: 82 BPM | SYSTOLIC BLOOD PRESSURE: 140 MMHG | DIASTOLIC BLOOD PRESSURE: 75 MMHG | OXYGEN SATURATION: 99 % | HEIGHT: 68 IN

## 2023-01-11 DIAGNOSIS — N62 GYNECOMASTIA: Primary | ICD-10-CM

## 2023-01-11 PROCEDURE — 3077F SYST BP >= 140 MM HG: CPT | Performed by: SURGERY

## 2023-01-11 PROCEDURE — 3017F COLORECTAL CA SCREEN DOC REV: CPT | Performed by: SURGERY

## 2023-01-11 PROCEDURE — 99213 OFFICE O/P EST LOW 20 MIN: CPT | Performed by: SURGERY

## 2023-01-11 PROCEDURE — G8484 FLU IMMUNIZE NO ADMIN: HCPCS | Performed by: SURGERY

## 2023-01-11 PROCEDURE — 3078F DIAST BP <80 MM HG: CPT | Performed by: SURGERY

## 2023-01-11 PROCEDURE — 1036F TOBACCO NON-USER: CPT | Performed by: SURGERY

## 2023-01-11 PROCEDURE — G8417 CALC BMI ABV UP PARAM F/U: HCPCS | Performed by: SURGERY

## 2023-01-11 PROCEDURE — G8427 DOCREV CUR MEDS BY ELIG CLIN: HCPCS | Performed by: SURGERY

## 2023-01-11 NOTE — PROGRESS NOTES
[facility]    PROCEDURE: 1) Excision of bilateral extensive gynecomastia                            2) Free nipple grafting  DATE: 22    Geoffrey Solares has been recovering satisfactorily since his procedure. Pain has been poorly controlled with intermittent pain medications. He notes that there is concern regarding discomfort and \"knots. \"    PMHx:   Past Medical History:   Diagnosis Date    Anxiety     Cancer ([facility] [address]     prostate    Diabetes mellitus ([facility] [address]     H/O cardiovascular stress test     ? 2016 at [facility] AT [facility]    H/O coronary angiogram     years ago at [facility]    Hyperlipidemia     Hypertension     PONV (postoperative nausea and vomiting)     Wears glasses      PSHx:   Past Surgical History:   Procedure Laterality Date    BREAST SURGERY Bilateral 2022    EXCISION OF BILATERAL GYNECOMASTIA WITH FREE NIPPLE GRAFTING performed by Ari Ware MD at [facility]. Right     NECK SURGERY      w/ screw    PROSTATECTOMY N/A [id number]    SHOULDER SURGERY      x2    US ASP ABSCESS/HEMATOMA/BULLA/CYST  2022    US ASP ABSCESS/HEMATOMA/BULLA/CYST 2022 Cedric Norton MD [facility] ULTRASOUND    WRIST TENOLYSIS      x2     Allergy: No Known Allergies    SHx:   Social History     Socioeconomic History    Marital status:      Spouse name: Not on file    Number of children: Not on file    Years of education: Not on file    Highest education level: Not on file   Occupational History    Not on file   Tobacco Use    Smoking status: Former     Types: Cigarettes     Quit date: 1982     Years since quittin.7    Smokeless tobacco: Never   Vaping Use    Vaping Use: Never used   Substance and Sexual Activity    Alcohol use: Yes     Comment: 1-2 drinks 4-5 x a week -liquor    Drug use: No    Sexual activity: Yes     Partners: Female   Other Topics Concern    Not on file   Social History Narrative    Not on file     Social Determinants of Health     Financial Resource Strain: Not on file   Food Insecurity: Not on file   Transportation Needs: Not on file   Physical Activity: Not on file   Stress: Not on file   Social Connections: Not on file   Intimate Partner Violence: Not on file   Housing Stability: Not on file     FHx: Family history reviewed and is noncontributory  Meds:   Current Outpatient Medications   Medication Sig Dispense Refill    pioglitazone (ACTOS) 30 MG tablet Take 30 mg by mouth daily      POTASSIUM CHLORIDE PO Take by mouth      GLIPIZIDE PO Take 5 mg by mouth daily      Nutritional Supplements (VITAMIN D MAINTENANCE PO) Take by mouth      Calcium Carbonate Antacid (CALCIUM CARBONATE PO) Take by mouth      oxyCODONE-acetaminophen (PERCOCET) 5-325 MG per tablet Take 1 tablet by mouth every 4 hours as needed for Pain.       abiraterone acetate (ZYTIGA) 250 MG tablet Take 4 tablets by mouth once daily on an empty stomach (1 hour before or 2 hours after eating) (Patient not taking: No sig reported)      PROLIA 60 MG/ML SOSY SC injection Bring to office for injection of 1 syringeful under the skin once every 6 months      predniSONE (DELTASONE) 5 MG tablet TAKE ONE TABLET BY MOUTH EVERY DAY WITH FOOD (Patient not taking: No sig reported)      triptorelin (TRELSTAR MIXJECT) 11.25 MG SUSR bring TO office FOR injection of ONE syringeful into muscle EVERY THREE MONTHS      aspirin 81 MG chewable tablet Take 1 tablet by mouth daily 30 tablet 0    carvedilol (COREG) 3.125 MG tablet Take 1 tablet by mouth 2 times daily (with meals) 60 tablet 0    amLODIPine (NORVASC) 5 MG tablet Take 5 mg by mouth daily      atorvastatin (LIPITOR) 40 MG tablet Take 40 mg by mouth daily      hydrochlorothiazide (HYDRODIURIL) 25 MG tablet Take 25 mg by mouth daily      lansoprazole (PREVACID) 30 MG delayed release capsule Take 30 mg by mouth daily      albuterol sulfate  (90 Base) MCG/ACT inhaler Inhale 2 puffs into the lungs every 6 hours as needed for Wheezing (Patient not taking: No sig reported)      lorazepam (ATIVAN) 0.5 MG tablet Take 1 mg by mouth every 12 hours as needed. metformin (GLUCOPHAGE) 500 MG tablet Take 1,000 mg by mouth daily (with breakfast)       No current facility-administered medications for this visit. ROS   Constitutional: Negative for chills and fever. Skin: See HPI    EXAM    Blood pressure (!) 140/75, pulse 82, temperature 97 °F (36.1 °C), height 5' 8\" (1.727 m), weight 215 lb (97.5 kg), SpO2 99 %. GEN: NAD   CHEST:  Incisions well healed  No hematoma/seroma  Nipple grafts intact with epidermolysis    PATHOLOGY: None    IMP: 62 y.o.male s/p excision of gynecomastia with free nipple grafting  PLAN: Palpable areas likely consistent with fat necrosis. He will return in 3 months and if pain still persistent, will obtain CT imaging.      Henrry Shetty MD  [address] [place] [address] [address] Reconstructive Surgery  [phone]  01/11/23

## 2023-04-25 NOTE — PROGRESS NOTES
[facility]    PROCEDURE: 1) Excision of bilateral extensive gynecomastia                            2) Free nipple grafting  DATE: 22    Milind Landon has been recovering satisfactorily since his procedure. Pain has been poorly controlled with intermittent pain medications. He notes that there is concern regarding discomfort and \"knots. \"    Since his last evaluation, chest pain improved. He still has some tightness and back pain. He is actively working to decrease weight. He is working on tattooing.     PMHx:   Past Medical History:   Diagnosis Date    Anxiety     Cancer ([facility] [address]     prostate    Diabetes mellitus ([facility] [address]     H/O cardiovascular stress test     ? 2016 at [facility] AT [facility]    H/O coronary angiogram     years ago at [facility]    Hyperlipidemia     Hypertension     PONV (postoperative nausea and vomiting)     Wears glasses      PSHx:   Past Surgical History:   Procedure Laterality Date    BREAST SURGERY Bilateral 2022    EXCISION OF BILATERAL GYNECOMASTIA WITH FREE NIPPLE GRAFTING performed by Esha Blake MD at [address] [place] [address]. Right     NECK SURGERY      w/ screw    PROSTATECTOMY N/A [id number]    SHOULDER SURGERY      x2    US ASP ABSCESS/HEMATOMA/BULLA/CYST  2022    US ASP ABSCESS/HEMATOMA/BULLA/CYST 2022 Kaitlin Escamilla  [address] [facility] ULTRASOUND    WRIST TENOLYSIS      x2     Allergy: No Known Allergies    SHx:   Social History     Socioeconomic History    Marital status:      Spouse name: Not on file    Number of children: Not on file    Years of education: Not on file    Highest education level: Not on file   Occupational History    Not on file   Tobacco Use    Smoking status: Former     Types: Cigarettes     Quit date: 1982     Years since quittin.9    Smokeless tobacco: Never   Vaping Use    Vaping Use: Never used   Substance and Sexual Activity    Alcohol use: Yes     Comment: 1-2 drinks 4-5 x a week -liquor

## 2023-04-26 ENCOUNTER — OFFICE VISIT (OUTPATIENT)
Dept: SURGERY | Age: 63
End: 2023-04-26
Payer: COMMERCIAL

## 2023-04-26 VITALS
SYSTOLIC BLOOD PRESSURE: 131 MMHG | OXYGEN SATURATION: 98 % | HEIGHT: 68 IN | WEIGHT: 213 LBS | DIASTOLIC BLOOD PRESSURE: 73 MMHG | TEMPERATURE: 97.3 F | HEART RATE: 82 BPM | RESPIRATION RATE: 19 BRPM | BODY MASS INDEX: 32.28 KG/M2

## 2023-04-26 DIAGNOSIS — M79.3 PANNICULITIS: Primary | ICD-10-CM

## 2023-04-26 PROCEDURE — 3078F DIAST BP <80 MM HG: CPT | Performed by: SURGERY

## 2023-04-26 PROCEDURE — 99213 OFFICE O/P EST LOW 20 MIN: CPT | Performed by: SURGERY

## 2023-04-26 PROCEDURE — G8427 DOCREV CUR MEDS BY ELIG CLIN: HCPCS | Performed by: SURGERY

## 2023-04-26 PROCEDURE — G8417 CALC BMI ABV UP PARAM F/U: HCPCS | Performed by: SURGERY

## 2023-04-26 PROCEDURE — 3017F COLORECTAL CA SCREEN DOC REV: CPT | Performed by: SURGERY

## 2023-04-26 PROCEDURE — 3074F SYST BP LT 130 MM HG: CPT | Performed by: SURGERY

## 2023-04-26 PROCEDURE — 1036F TOBACCO NON-USER: CPT | Performed by: SURGERY

## 2023-06-06 ENCOUNTER — HOSPITAL ENCOUNTER (OUTPATIENT)
Dept: MRI IMAGING | Age: 63
Discharge: HOME | End: 2023-06-06
Payer: COMMERCIAL

## 2023-06-06 DIAGNOSIS — C79.51 SECONDARY MALIGNANT NEOPLASM OF BONE (HCC): ICD-10-CM

## 2023-06-06 DIAGNOSIS — N52.03 COMB ARTRL INSUFF & CORPORO-VENOUS OCCLUSV ERECTILE DYSFNCT: ICD-10-CM

## 2023-06-06 DIAGNOSIS — R97.20 ELEVATED PROSTATE SPECIFIC ANTIGEN (PSA): ICD-10-CM

## 2023-06-06 DIAGNOSIS — C61 MALIGNANT NEOPLASM OF PROSTATE (HCC): ICD-10-CM

## 2023-06-06 DIAGNOSIS — R97.21 RISING PSA FOLLOWING TREATMENT FOR MALIGNANT NEOPLASM OF PROSTATE: ICD-10-CM

## 2023-06-06 DIAGNOSIS — N52.31 ERECTILE DYSFUNCTION FOLLOWING RADICAL PROSTATECTOMY: ICD-10-CM

## 2023-06-06 PROCEDURE — 73721 MRI JNT OF LWR EXTRE W/O DYE: CPT

## 2023-10-24 NOTE — PROGRESS NOTES
[facility]    PROCEDURE: 1) Excision of bilateral extensive gynecomastia                            2) Free nipple grafting  DATE: 22    Fernando Banks has been recovering satisfactorily since his procedure. Pain has been poorly controlled with intermittent pain medications. He notes that there is concern regarding discomfort and \"knots. \" He still has some tightness and back pain. He is actively working to decrease weight. He is working on tattooing. Since his last evaluation, chest pain improved. However, he is unhappy with respect to the lack of pigment on his nipple grafts as well as his excess volume.     PMHx:   Past Medical History:   Diagnosis Date    Anxiety     Cancer ([address])     prostate    Diabetes mellitus ([address])     H/O cardiovascular stress test     ? 2016 at [facility] AT [facility]    H/O coronary angiogram     years ago at [facility]    Hyperlipidemia     Hypertension     PONV (postoperative nausea and vomiting)     Wears glasses      PSHx:   Past Surgical History:   Procedure Laterality Date    BREAST SURGERY Bilateral 2022    EXCISION OF BILATERAL GYNECOMASTIA WITH FREE NIPPLE GRAFTING performed by Shyanne De Los Santos MD at [address] Right     NECK SURGERY      w/ screw    PROSTATECTOMY N/A [id number]    SHOULDER SURGERY      x2    US ASP ABSCESS/HEMATOMA/BULLA/CYST  2022    US ASP ABSCESS/HEMATOMA/BULLA/CYST 2022 Rudi Anderson  [address] ULTRASOUND    WRIST TENOLYSIS      x2     Allergy: No Known Allergies    SHx:   Social History     Socioeconomic History    Marital status:      Spouse name: Not on file    Number of children: Not on file    Years of education: Not on file    Highest education level: Not on file   Occupational History    Not on file   Tobacco Use    Smoking status: Former     Types: Cigarettes     Quit date: 1982     Years since quittin.4    Smokeless tobacco: Never   Vaping Use    Vaping Use:

## 2023-10-25 ENCOUNTER — OFFICE VISIT (OUTPATIENT)
Dept: SURGERY | Age: 63
End: 2023-10-25
Payer: COMMERCIAL

## 2023-10-25 VITALS
BODY MASS INDEX: 31.32 KG/M2 | SYSTOLIC BLOOD PRESSURE: 130 MMHG | DIASTOLIC BLOOD PRESSURE: 73 MMHG | TEMPERATURE: 97.6 F | OXYGEN SATURATION: 97 % | RESPIRATION RATE: 76 BRPM | WEIGHT: 206 LBS | HEART RATE: 75 BPM

## 2023-10-25 DIAGNOSIS — L98.7 EXCESS SKIN: Primary | ICD-10-CM

## 2023-10-25 PROCEDURE — 3078F DIAST BP <80 MM HG: CPT | Performed by: SURGERY

## 2023-10-25 PROCEDURE — 99213 OFFICE O/P EST LOW 20 MIN: CPT | Performed by: SURGERY

## 2023-10-25 PROCEDURE — 1036F TOBACCO NON-USER: CPT | Performed by: SURGERY

## 2023-10-25 PROCEDURE — 3017F COLORECTAL CA SCREEN DOC REV: CPT | Performed by: SURGERY

## 2023-10-25 PROCEDURE — G8427 DOCREV CUR MEDS BY ELIG CLIN: HCPCS | Performed by: SURGERY

## 2023-10-25 PROCEDURE — G8484 FLU IMMUNIZE NO ADMIN: HCPCS | Performed by: SURGERY

## 2023-10-25 PROCEDURE — G8417 CALC BMI ABV UP PARAM F/U: HCPCS | Performed by: SURGERY

## 2023-10-25 PROCEDURE — 3075F SYST BP GE 130 - 139MM HG: CPT | Performed by: SURGERY

## 2023-11-20 ENCOUNTER — HOSPITAL ENCOUNTER (OUTPATIENT)
Dept: NUCLEAR MEDICINE | Age: 63
Discharge: HOME | End: 2023-11-20
Attending: UROLOGY
Payer: COMMERCIAL

## 2023-11-20 DIAGNOSIS — N52.31 ERECTILE DYSFUNCTION FOLLOWING RADICAL PROSTATECTOMY: ICD-10-CM

## 2023-11-20 DIAGNOSIS — C61 MALIGNANT NEOPLASM OF PROSTATE (HCC): ICD-10-CM

## 2023-11-20 DIAGNOSIS — N52.03 COMBINED ARTERIAL INSUFFICIENCY AND CORPORO-VENOUS OCCLUSIVE ERECTILE DYSFUNCTION: ICD-10-CM

## 2023-11-20 DIAGNOSIS — R97.21 INCREASING PROSTATE SPECIFIC ANTIGEN (PSA) LEVEL AFTER TREATMENT FOR MALIGNANT NEOPLASM OF PROSTATE: ICD-10-CM

## 2023-11-20 DIAGNOSIS — C79.51 METASTASIS TO BONE (HCC): ICD-10-CM

## 2023-11-20 DIAGNOSIS — R97.20 ELEVATED PROSTATE SPECIFIC ANTIGEN (PSA): ICD-10-CM

## 2023-11-20 PROCEDURE — A9503 TC99M MEDRONATE: HCPCS | Performed by: UROLOGY

## 2023-11-20 PROCEDURE — 78306 BONE IMAGING WHOLE BODY: CPT | Performed by: UROLOGY

## 2023-11-20 PROCEDURE — 3430000000 HC RX DIAGNOSTIC RADIOPHARMACEUTICAL: Performed by: UROLOGY

## 2023-11-20 RX ORDER — TC 99M MEDRONATE 20 MG/10ML
26.3 INJECTION, POWDER, LYOPHILIZED, FOR SOLUTION INTRAVENOUS
Status: COMPLETED | OUTPATIENT
Start: 2023-11-20 | End: 2023-11-20

## 2023-11-20 RX ADMIN — TC 99M MEDRONATE 26.3 MILLICURIE: 20 INJECTION, POWDER, LYOPHILIZED, FOR SOLUTION INTRAVENOUS at 11:04

## 2023-12-01 ENCOUNTER — HOSPITAL ENCOUNTER (OUTPATIENT)
Dept: CT IMAGING | Age: 63
Discharge: HOME | End: 2023-12-01
Attending: UROLOGY
Payer: COMMERCIAL

## 2023-12-01 DIAGNOSIS — C79.51 METASTASIS TO BONE (HCC): ICD-10-CM

## 2023-12-01 LAB
PERFORMED ON: NORMAL
POC CREATININE: 1.1 MG/DL (ref 0.8–1.3)
POC SAMPLE TYPE: NORMAL

## 2023-12-01 PROCEDURE — 82565 ASSAY OF CREATININE: CPT

## 2023-12-01 PROCEDURE — 72193 CT PELVIS W/DYE: CPT

## 2023-12-01 PROCEDURE — 6360000004 HC RX CONTRAST MEDICATION: Performed by: UROLOGY

## 2023-12-01 RX ADMIN — IOPAMIDOL 75 ML: 755 INJECTION, SOLUTION INTRAVENOUS at 14:20

## (undated) DEVICE — BLADE ES ELASTOMERIC COAT INSUL DURABLE BEND UPTO 90DEG

## (undated) DEVICE — PLASTIC MAJOR: Brand: MEDLINE INDUSTRIES, INC.

## (undated) DEVICE — INTENDED USE FOR SURGICAL MARKING ON INTACT SKIN, ALSO PROVIDES A PERMANENT METHOD OF IDENTIFYING OBJECTS IN THE OPERATING ROOM: Brand: WRITESITE® PLUS MINI PREP RESISTANT MARKER

## (undated) DEVICE — 1010 S-DRAPE TOWEL DRAPE 10/BX: Brand: STERI-DRAPE™

## (undated) DEVICE — Device

## (undated) DEVICE — 3M™ TEGADERM™ TRANSPARENT FILM DRESSING FRAME STYLE, 1624W, 2-3/8 IN X 2-3/4 IN (6 CM X 7 CM), 100/CT 4CT/CASE: Brand: 3M™ TEGADERM™

## (undated) DEVICE — GLOVE ORANGE PI 7 1/2   MSG9075

## (undated) DEVICE — APPLIER LIG CLP M L11IN TI STR RNG HNDL FOR 20 CLP DISP

## (undated) DEVICE — CLEANER,CAUTERY TIP,2X2",STERILE: Brand: MEDLINE

## (undated) DEVICE — BANDAGE COMPR M W6INXL10YD WHT BGE VELC E MTRX HK AND LOOP

## (undated) DEVICE — APPLICATOR MEDICATED 26 CC SOLUTION HI LT ORNG CHLORAPREP

## (undated) DEVICE — DRAIN SURG 15FR L3 16IN DIA47MM SIL RND HUBLESS FULL FLUT

## (undated) DEVICE — ASPIRATION TUBING SET, DISPOSABLE: Brand: MICROAIRE®

## (undated) DEVICE — MARKER,SKIN,WI/RULER AND LABELS: Brand: MEDLINE

## (undated) DEVICE — RESERVOIR,SUCTION,100CC,SILICONE: Brand: MEDLINE

## (undated) DEVICE — BLANKET WRM W40.2XL55.9IN IORT LO BODY + MISTRAL AIR

## (undated) DEVICE — SPONGE,LAP,18"X18",DLX,XR,ST,5/PK,40/PK: Brand: MEDLINE

## (undated) DEVICE — PADDING CAST W20XL29.8IN FOAM SELF ADH M SUPP LTWT RESTON

## (undated) DEVICE — STAPLER SKIN H3.9MM WIRE DIA0.58MM CRWN 6.9MM 35 STPL ROT

## (undated) DEVICE — 4MM SINGLE USE CANNULA, 10MM PORT, 22CM LONG, MERCEDES: Brand: MICROAIRE®

## (undated) DEVICE — AGENT HEMSTAT 3GM OXIDIZED REGENERATED CELOS ABSRB FOR CONT (ORDER MULTIPLES OF 5EA)

## (undated) DEVICE — DRESSING FOAM DISK DIA1IN H 7MM HYDRPHLC CHG IMPREG IN SL

## (undated) DEVICE — TOWEL,STOP FLAG GOLD N-W: Brand: MEDLINE

## (undated) DEVICE — STANDARD HYPODERMIC NEEDLE,POLYPROPYLENE HUB: Brand: MONOJECT

## (undated) DEVICE — SUTURE VCRL SZ 2-0 L18IN ABSRB VLT L26MM SH 1/2 CIR J775D

## (undated) DEVICE — SOLUTION IV 1000ML 0.9% SOD CHL

## (undated) DEVICE — YANKAUER,OPEN TIP,W/O VENT,STERILE: Brand: MEDLINE INDUSTRIES, INC.

## (undated) DEVICE — GOWN,SIRUS,POLYRNF,BRTHSLV,LG,30/CS: Brand: MEDLINE

## (undated) DEVICE — GLOVE ORANGE PI 8 1/2   MSG9085